# Patient Record
Sex: MALE | Race: WHITE | Employment: FULL TIME | URBAN - METROPOLITAN AREA
[De-identification: names, ages, dates, MRNs, and addresses within clinical notes are randomized per-mention and may not be internally consistent; named-entity substitution may affect disease eponyms.]

---

## 2017-02-13 ENCOUNTER — HOSPITAL ENCOUNTER (OUTPATIENT)
Dept: PREADMISSION TESTING | Age: 62
Discharge: HOME | End: 2017-02-13
Attending: UROLOGY
Payer: COMMERCIAL

## 2017-02-13 LAB
ATRIAL RATE: 61 BPM
CALCULATED P AXIS, ECG09: 33 DEGREES
CALCULATED R AXIS, ECG10: 75 DEGREES
CALCULATED T AXIS, ECG11: 35 DEGREES
DIAGNOSIS, 93000: NORMAL
HCT VFR BLD AUTO: 45.6 % (ref 36–48)
HGB BLD-MCNC: 15.9 G/DL (ref 13–16)
P-R INTERVAL, ECG05: 194 MS
Q-T INTERVAL, ECG07: 410 MS
QRS DURATION, ECG06: 104 MS
QTC CALCULATION (BEZET), ECG08: 412 MS
VENTRICULAR RATE, ECG03: 61 BPM

## 2017-02-13 PROCEDURE — 36415 COLL VENOUS BLD VENIPUNCTURE: CPT | Performed by: UROLOGY

## 2017-02-13 PROCEDURE — 85018 HEMOGLOBIN: CPT | Performed by: UROLOGY

## 2017-02-13 PROCEDURE — 93005 ELECTROCARDIOGRAM TRACING: CPT

## 2017-02-13 RX ORDER — SODIUM CHLORIDE 0.9 % (FLUSH) 0.9 %
5-10 SYRINGE (ML) INJECTION AS NEEDED
Status: CANCELLED | OUTPATIENT
Start: 2017-02-13

## 2017-02-13 RX ORDER — HYDROMORPHONE HYDROCHLORIDE 2 MG/ML
0.5 INJECTION, SOLUTION INTRAMUSCULAR; INTRAVENOUS; SUBCUTANEOUS
Status: CANCELLED | OUTPATIENT
Start: 2017-02-13

## 2017-02-13 RX ORDER — SODIUM CHLORIDE, SODIUM LACTATE, POTASSIUM CHLORIDE, CALCIUM CHLORIDE 600; 310; 30; 20 MG/100ML; MG/100ML; MG/100ML; MG/100ML
1000 INJECTION, SOLUTION INTRAVENOUS CONTINUOUS
Status: CANCELLED | OUTPATIENT
Start: 2017-02-13

## 2017-02-13 RX ORDER — INSULIN LISPRO 100 [IU]/ML
INJECTION, SOLUTION INTRAVENOUS; SUBCUTANEOUS ONCE
Status: CANCELLED | OUTPATIENT
Start: 2017-02-13 | End: 2017-02-14

## 2017-02-13 RX ORDER — NALOXONE HYDROCHLORIDE 0.4 MG/ML
0.1 INJECTION, SOLUTION INTRAMUSCULAR; INTRAVENOUS; SUBCUTANEOUS AS NEEDED
Status: CANCELLED | OUTPATIENT
Start: 2017-02-13

## 2017-02-13 RX ORDER — FLUMAZENIL 0.1 MG/ML
0.2 INJECTION INTRAVENOUS
Status: CANCELLED | OUTPATIENT
Start: 2017-02-13

## 2017-02-13 RX ORDER — MAGNESIUM SULFATE 100 %
4 CRYSTALS MISCELLANEOUS AS NEEDED
Status: CANCELLED | OUTPATIENT
Start: 2017-02-13

## 2017-02-13 RX ORDER — DEXTROSE 50 % IN WATER (D50W) INTRAVENOUS SYRINGE
25-50 AS NEEDED
Status: CANCELLED | OUTPATIENT
Start: 2017-02-13

## 2017-02-14 ENCOUNTER — HOSPITAL ENCOUNTER (OUTPATIENT)
Age: 62
Setting detail: OUTPATIENT SURGERY
Discharge: HOME | End: 2017-02-14
Attending: UROLOGY | Admitting: UROLOGY
Payer: COMMERCIAL

## 2017-02-14 ENCOUNTER — ANESTHESIA (OUTPATIENT)
Dept: SURGERY | Age: 62
End: 2017-02-14
Payer: COMMERCIAL

## 2017-02-14 ENCOUNTER — ANESTHESIA EVENT (OUTPATIENT)
Dept: SURGERY | Age: 62
End: 2017-02-14
Payer: COMMERCIAL

## 2017-02-14 VITALS
HEART RATE: 55 BPM | RESPIRATION RATE: 16 BRPM | TEMPERATURE: 98.2 F | OXYGEN SATURATION: 97 % | HEIGHT: 72 IN | DIASTOLIC BLOOD PRESSURE: 69 MMHG | WEIGHT: 308.9 LBS | BODY MASS INDEX: 41.84 KG/M2 | SYSTOLIC BLOOD PRESSURE: 146 MMHG

## 2017-02-14 PROCEDURE — 76210000022 HC REC RM PH II 1.5 TO 2 HR: Performed by: UROLOGY

## 2017-02-14 PROCEDURE — 77030012020 HC ANTENA NEURSTM MEDT -B: Performed by: UROLOGY

## 2017-02-14 PROCEDURE — 74011000250 HC RX REV CODE- 250

## 2017-02-14 PROCEDURE — C1787 PATIENT PROGR, NEUROSTIM: HCPCS | Performed by: UROLOGY

## 2017-02-14 PROCEDURE — 77030031139 HC SUT VCRL2 J&J -A: Performed by: UROLOGY

## 2017-02-14 PROCEDURE — 74011250636 HC RX REV CODE- 250/636: Performed by: UROLOGY

## 2017-02-14 PROCEDURE — 76010000138 HC OR TIME 0.5 TO 1 HR: Performed by: UROLOGY

## 2017-02-14 PROCEDURE — C1767 GENERATOR, NEURO NON-RECHARG: HCPCS | Performed by: UROLOGY

## 2017-02-14 PROCEDURE — 74011250636 HC RX REV CODE- 250/636

## 2017-02-14 PROCEDURE — 77030010507 HC ADH SKN DERMBND J&J -B: Performed by: UROLOGY

## 2017-02-14 PROCEDURE — 77030018836 HC SOL IRR NACL ICUM -A: Performed by: UROLOGY

## 2017-02-14 PROCEDURE — 74011000250 HC RX REV CODE- 250: Performed by: UROLOGY

## 2017-02-14 PROCEDURE — 76060000032 HC ANESTHESIA 0.5 TO 1 HR: Performed by: UROLOGY

## 2017-02-14 PROCEDURE — 77030020782 HC GWN BAIR PAWS FLX 3M -B: Performed by: UROLOGY

## 2017-02-14 PROCEDURE — 77030011640 HC PAD GRND REM COVD -A: Performed by: UROLOGY

## 2017-02-14 DEVICE — GENERATOR INTERSTIM X --: Type: IMPLANTABLE DEVICE | Site: BUTTOCKS | Status: FUNCTIONAL

## 2017-02-14 RX ORDER — KETAMINE HYDROCHLORIDE 10 MG/ML
INJECTION, SOLUTION INTRAMUSCULAR; INTRAVENOUS AS NEEDED
Status: DISCONTINUED | OUTPATIENT
Start: 2017-02-14 | End: 2017-02-14 | Stop reason: HOSPADM

## 2017-02-14 RX ORDER — GLYCOPYRROLATE 0.2 MG/ML
INJECTION INTRAMUSCULAR; INTRAVENOUS AS NEEDED
Status: DISCONTINUED | OUTPATIENT
Start: 2017-02-14 | End: 2017-02-14 | Stop reason: HOSPADM

## 2017-02-14 RX ORDER — FENTANYL CITRATE 50 UG/ML
INJECTION, SOLUTION INTRAMUSCULAR; INTRAVENOUS AS NEEDED
Status: DISCONTINUED | OUTPATIENT
Start: 2017-02-14 | End: 2017-02-14 | Stop reason: HOSPADM

## 2017-02-14 RX ORDER — PROPOFOL 10 MG/ML
INJECTION, EMULSION INTRAVENOUS
Status: DISCONTINUED | OUTPATIENT
Start: 2017-02-14 | End: 2017-02-14 | Stop reason: HOSPADM

## 2017-02-14 RX ORDER — HYDROCODONE BITARTRATE AND ACETAMINOPHEN 5; 325 MG/1; MG/1
1 TABLET ORAL
Qty: 20 TAB | Refills: 0 | Status: SHIPPED | OUTPATIENT
Start: 2017-02-14 | End: 2018-08-27

## 2017-02-14 RX ORDER — ASPIRIN 81 MG/1
TABLET ORAL DAILY
COMMUNITY
End: 2018-08-27

## 2017-02-14 RX ORDER — SODIUM CHLORIDE, SODIUM LACTATE, POTASSIUM CHLORIDE, CALCIUM CHLORIDE 600; 310; 30; 20 MG/100ML; MG/100ML; MG/100ML; MG/100ML
125 INJECTION, SOLUTION INTRAVENOUS CONTINUOUS
Status: DISCONTINUED | OUTPATIENT
Start: 2017-02-14 | End: 2017-02-14 | Stop reason: HOSPADM

## 2017-02-14 RX ORDER — MIDAZOLAM HYDROCHLORIDE 1 MG/ML
INJECTION, SOLUTION INTRAMUSCULAR; INTRAVENOUS AS NEEDED
Status: DISCONTINUED | OUTPATIENT
Start: 2017-02-14 | End: 2017-02-14 | Stop reason: HOSPADM

## 2017-02-14 RX ADMIN — KETAMINE HYDROCHLORIDE 20 MG: 10 INJECTION, SOLUTION INTRAMUSCULAR; INTRAVENOUS at 09:14

## 2017-02-14 RX ADMIN — GLYCOPYRROLATE 0.1 MG: 0.2 INJECTION INTRAMUSCULAR; INTRAVENOUS at 09:10

## 2017-02-14 RX ADMIN — SODIUM CHLORIDE, SODIUM LACTATE, POTASSIUM CHLORIDE, AND CALCIUM CHLORIDE 125 ML/HR: 600; 310; 30; 20 INJECTION, SOLUTION INTRAVENOUS at 07:39

## 2017-02-14 RX ADMIN — KETAMINE HYDROCHLORIDE 10 MG: 10 INJECTION, SOLUTION INTRAMUSCULAR; INTRAVENOUS at 09:23

## 2017-02-14 RX ADMIN — PROPOFOL 75 MCG/KG/MIN: 10 INJECTION, EMULSION INTRAVENOUS at 09:14

## 2017-02-14 RX ADMIN — MIDAZOLAM HYDROCHLORIDE 3 MG: 1 INJECTION, SOLUTION INTRAMUSCULAR; INTRAVENOUS at 09:07

## 2017-02-14 RX ADMIN — VANCOMYCIN HYDROCHLORIDE 2 G: 10 INJECTION, POWDER, LYOPHILIZED, FOR SOLUTION INTRAVENOUS at 09:07

## 2017-02-14 RX ADMIN — FENTANYL CITRATE 100 MCG: 50 INJECTION, SOLUTION INTRAMUSCULAR; INTRAVENOUS at 09:11

## 2017-02-14 RX ADMIN — MIDAZOLAM HYDROCHLORIDE 2 MG: 1 INJECTION, SOLUTION INTRAMUSCULAR; INTRAVENOUS at 09:10

## 2017-02-14 NOTE — DISCHARGE INSTRUCTIONS
DISCHARGE SUMMARY from Nurse    The following personal items are in your possession at time of discharge:    Dental Appliances: None  Visual Aid: None     Home Medications: None  Jewelry: None  Clothing: Pants, Shirt, Undergarments, Footwear, Socks, Jacket/Coat (locker 12)  Other Valuables: Verl Pencil, Cell Phone (with Adithyakelsea Hidalgo)             PATIENT INSTRUCTIONS:    After general anesthesia or intravenous sedation, for 24 hours or while taking prescription Narcotics:  · Limit your activities  · Do not drive and operate hazardous machinery  · Do not make important personal or business decisions  · Do  not drink alcoholic beverages  · If you have not urinated within 8 hours after discharge, please contact your surgeon on call. Report the following to your surgeon:  · Excessive pain, swelling, redness or odor of or around the surgical area  · Temperature over 100.5  · Nausea and vomiting lasting longer than 4 hours or if unable to take medications  · Any signs of decreased circulation or nerve impairment to extremity: change in color, persistent  numbness, tingling, coldness or increase pain  · Any questions        What to do at Home:  Regular diet drink lots of liquids  Ok to shower in 48 hrs  No heavy lifting today  Dr Keron Browning will call regarding a post op check up    If you experience any of the following symptoms heavy bleeding, fevers, severe pain, unable to void, please follow up with dr Keron Browning. *  Please give a list of your current medications to your Primary Care Provider. *  Please update this list whenever your medications are discontinued, doses are      changed, or new medications (including over-the-counter products) are added. *  Please carry medication information at all times in case of emergency situations.           These are general instructions for a healthy lifestyle:    No smoking/ No tobacco products/ Avoid exposure to second hand smoke    Surgeon General's Warning:  Quitting smoking now greatly reduces serious risk to your health. Obesity, smoking, and sedentary lifestyle greatly increases your risk for illness    A healthy diet, regular physical exercise & weight monitoring are important for maintaining a healthy lifestyle    You may be retaining fluid if you have a history of heart failure or if you experience any of the following symptoms:  Weight gain of 3 pounds or more overnight or 5 pounds in a week, increased swelling in our hands or feet or shortness of breath while lying flat in bed. Please call your doctor as soon as you notice any of these symptoms; do not wait until your next office visit. Recognize signs and symptoms of STROKE:    F-face looks uneven    A-arms unable to move or move unevenly    S-speech slurred or non-existent    T-time-call 911 as soon as signs and symptoms begin-DO NOT go       Back to bed or wait to see if you get better-TIME IS BRAIN. Warning Signs of HEART ATTACK     Call 911 if you have these symptoms:   Chest discomfort. Most heart attacks involve discomfort in the center of the chest that lasts more than a few minutes, or that goes away and comes back. It can feel like uncomfortable pressure, squeezing, fullness, or pain.  Discomfort in other areas of the upper body. Symptoms can include pain or discomfort in one or both arms, the back, neck, jaw, or stomach.  Shortness of breath with or without chest discomfort.  Other signs may include breaking out in a cold sweat, nausea, or lightheadedness. Don't wait more than five minutes to call 911 - MINUTES MATTER! Fast action can save your life. Calling 911 is almost always the fastest way to get lifesaving treatment. Emergency Medical Services staff can begin treatment when they arrive -- up to an hour sooner than if someone gets to the hospital by car. The discharge information has been reviewed with the patient and caregiver. The patient and caregiver verbalized understanding.     Discharge medications reviewed with the patient and caregiver and appropriate educational materials and side effects teaching were provided.     Patient armband removed and shredded

## 2017-02-14 NOTE — INTERVAL H&P NOTE
H&P Update:  Daron Keyes was seen and examined. History and physical has been reviewed. The patient has been examined.  There have been no significant clinical changes since the completion of the originally dated History and Physical.    Signed By: Manuel Cota MD     February 14, 2017 8:51 AM

## 2017-02-14 NOTE — ANESTHESIA PREPROCEDURE EVALUATION
Anesthetic History   No history of anesthetic complications            Review of Systems / Medical History  Patient summary reviewed, nursing notes reviewed and pertinent labs reviewed    Pulmonary  Within defined limits      Sleep apnea: CPAP           Neuro/Psych   Within defined limits           Cardiovascular  Within defined limits  Hypertension: well controlled              Exercise tolerance: >4 METS     GI/Hepatic/Renal  Within defined limits              Endo/Other        Morbid obesity and arthritis     Other Findings              Physical Exam    Airway  Mallampati: III  TM Distance: < 4 cm  Neck ROM: normal range of motion   Mouth opening: Normal     Cardiovascular    Rhythm: regular  Rate: normal         Dental    Dentition: Poor dentition  Comments: Poor dentition on top. Broken and missing teeth.     Pulmonary  Breath sounds clear to auscultation               Abdominal  GI exam deferred       Other Findings            Anesthetic Plan    ASA: 3  Anesthesia type: MAC          Induction: Intravenous  Anesthetic plan and risks discussed with: Patient

## 2017-02-14 NOTE — ANESTHESIA POSTPROCEDURE EVALUATION
Post-Anesthesia Evaluation & Assessment    Visit Vitals    /70    Pulse (!) 56    Temp 36.8 °C (98.2 °F)    Resp 16    Ht 6' (1.829 m)    Wt 140.1 kg (308 lb 14.4 oz)    SpO2 96%    BMI 41.89 kg/m2       No untreated/active PONV    Post-operative hydration adequate. Adequate post-operative analgesia per PACU discharge criteria    Mental status & level of consciousness: alert and oriented x 3    Respiratory status: patent unassisted airway     No apparent anesthetic complications requiring additional post-anesthetic care    Patient has met all discharge requirements.             Gio Bailey MD

## 2017-02-14 NOTE — OP NOTES
[address] [place] [address]      PREOPERATIVE DIAGNOSIS:  URGE INCONTINENCE    POSTOPERATIVE DIAGNOSIS:  urge incontinence     PROCEDURE:  Second-stage InterStim battery change with programming. SURGEON:  Tiffanie Whaley MD    ANESTHESIA:  IV sedation. SPECIMENS: interstim neuromodulator    FINDINGS:  new generator with excellent impedences. BLOOD LOSS:  Minimal.    DRAINS AND TUBES:  None. COMPLICATIONS:  None. DESCRIPTION OF PROCEDURE:  After informed consent was obtained, the patient was taken to the operating room, was placed in the prone position after IV sedation was instituted. The patient was prepped and draped in usual surgical fashion. Local anesthetic was used to anesthetize the pocket. The pocket was then opened with a scalpel, and sharp and blunt dissection was used to open the pocket up. The interstim generator was explanted from the pocket without problem,  The lead was disonnected from the generator after unsrewing the connecting bolt and the old generator was passed off. Copious irrigation was done with antibiotic solution. The InterStim neurostimulator II was then connected to the tined lead after it was dried off. The hex nut wrench was used to tighten down the ratcheted hex nut. The neurostimulator was placed back into the pocket with the wire hidden underneath it. A sterile towel was placed over the wound. Next, the programming antenna was placed into a sterile glove and placed on top of the towel. The neurostimulator was interrogated and impedances were between 50 and 4000, indicating appropriate function. The antenna was passed off the field sterilely. The towel was passed off the field. The wound was then closed in two layers; a 3-0 Vicryl running suture for the Amy layer, and a 4-0 Vicryl for the subcuticular layer. The wound was then washed off and dried. Dermabond was placed over the incisions, and the procedure ended.  The patient was placed in the supine position on the stretcher, awakened from IV sedation, and transferred to the recovery room awake, alert, and in stable condition.

## 2017-02-14 NOTE — IP AVS SNAPSHOT
Rashard Peng 
 
 
 [address] [address] Patient: Ronaldo Guzmán MRN: [id number] You are allergic to the following No active allergies Recent Documentation Height Weight BMI Smoking Status 1.829 m 140.1 kg 41.89 kg/m2 Former Smoker Emergency Contacts Name Discharge Info Relation Home Work Mobile Gisella Lugo DISCHARGE CAREGIVER [3] Spouse [3] [phone] Silvia Guerra CAREGIVER [3] Daughter [21] [phone] About your hospitalization You were admitted on:  February 14, 2017 You last received care in the:  [facility] PHASE 2 RECOVERY You were discharged on:  February 14, 2017 Unit phone number:    
  
Why you were hospitalized Your primary diagnosis was:  Urge Incontinence Of Urine Providers Seen During Your Hospitalizations Provider Role Specialty Primary office phone Jeannette Belle MD Attending Provider Urology [phone] Your Primary Care Physician (PCP) Primary Care Physician Office Phone Office Fax 1 [facility], [address] [phone] Follow-up Information Follow up With Details Comments Contact Info Antwon France MD   [address] [place] [id number] [phone] Current Discharge Medication List  
  
START taking these medications Dose & Instructions Dispensing Information Comments Morning Noon Evening Bedtime HYDROcodone-acetaminophen 5-325 mg per tablet Commonly known as:  Gabe Handler Your next dose is: Today, Tomorrow Other:  _________ Dose:  1 Tab Take 1 Tab by mouth every four (4) hours as needed for Pain. Max Daily Amount: 6 Tabs. Quantity:  20 Tab Refills:  0 CONTINUE these medications which have NOT CHANGED Dose & Instructions Dispensing Information Comments Morning Noon Evening Bedtime acetaminophen 500 mg tablet Commonly known as:  TYLENOL Your next dose is: Today, Tomorrow Other:  _________ Dose:  500 mg Take 500 mg by mouth every six (6) hours as needed for Pain. Refills:  0  
     
   
   
   
  
 AFRIN NASAL SPRAY NA Your next dose is: Today, Tomorrow Other:  _________ Dose:  2 Spray 2 Sprays by Nasal route. Each nostril by inhalation daily as needed Refills:  0  
     
   
   
   
  
 alfuzosin SR 10 mg SR tablet Commonly known as:  Isadora Ford Your next dose is: Today, Tomorrow Other:  _________ Dose:  10 mg Take 10 mg by mouth nightly. Indications: SYMPTOMATIC BENIGN PROSTATIC HYPERPLASIA Refills:  0  
     
   
   
   
  
 amLODIPine 10 mg tablet Commonly known as:  Erasto Chafe Your next dose is: Today, Tomorrow Other:  _________ Dose:  10 mg Take 10 mg by mouth daily. Indications: HYPERTENSION Refills:  0  
     
   
   
   
  
 aspirin delayed-release 81 mg tablet Your next dose is: Today, Tomorrow Other:  _________ Take  by mouth daily. Refills:  0  
     
   
   
   
  
 B COMPLEX 1 tablet Generic drug:  b complex vitamins Your next dose is: Today, Tomorrow Other:  _________ Dose:  1 Tab Take 1 Tab by mouth daily. Refills:  0  
     
   
   
   
  
 carvedilol 12.5 mg tablet Commonly known as:  Nakia Dome Your next dose is: Today, Tomorrow Other:  _________ Dose:  12.5 mg Take 12.5 mg by mouth two (2) times daily (with meals). Indications: hypertension Refills:  0  
     
   
   
   
  
 cholecalciferol (VITAMIN D3) 5,000 unit Tab tablet Commonly known as:  VITAMIN D3 Your next dose is: Today, Tomorrow Other:  _________ Dose:  5000 Units Take 5,000 Units by mouth daily. Refills:  0  
     
   
   
   
  
 CYMBALTA 20 mg capsule Generic drug:  DULoxetine Your next dose is: Today, Tomorrow Other:  _________ Take  by mouth daily. Indications: ANXIETY WITH DEPRESSION Refills:  0 GLUCOSAMINE HCL-MSM PO Your next dose is: Today, Tomorrow Other:  _________ Dose:  2 Tab Take 2 Tabs by mouth daily. Refills:  0  
     
   
   
   
  
 ibuprofen 800 mg tablet Commonly known as:  MOTRIN Your next dose is: Today, Tomorrow Other:  _________ Dose:  800 mg Take 800 mg by mouth every six (6) hours as needed for Pain. Refills:  0  
     
   
   
   
  
 LASIX 20 mg tablet Generic drug:  furosemide Your next dose is: Today, Tomorrow Other:  _________ Dose:  20 mg Take 20 mg by mouth daily. Indications: Edema Refills:  0  
     
   
   
   
  
 lysine 500 mg Tab tablet Commonly known as:  L-LYSINE Your next dose is: Today, Tomorrow Other:  _________ Dose:  500 mg Take 500 mg by mouth daily. Refills:  0  
     
   
   
   
  
 multivitamin tablet Commonly known as:  ONE A DAY Your next dose is: Today, Tomorrow Other:  _________ Dose:  1 Tab Take 1 Tab by mouth daily. Refills:  0  
     
   
   
   
  
 testosterone cypionate 200 mg/mL injection Commonly known as:  DEPOTESTOTERONE CYPIONATE Your next dose is: Today, Tomorrow Other:  _________ Dose:  200 mg  
200 mg by IntraMUSCular route once. Every 10 days Refills:  0  
     
   
   
   
  
 tolterodine ER 4 mg ER capsule Commonly known as:  Júniorrefugio Galvezs Your next dose is: Today, Tomorrow Other:  _________ Dose:  4 mg Take 4 mg by mouth daily. Indications: INCREASED URINARY FREQUENCY Refills:  0  
     
   
   
   
  
 valsartan-hydroCHLOROthiazide 320-25 mg per tablet Commonly known as:  DIOVAN-HCT Your next dose is: Today, Tomorrow Other:  _________ Dose:  1 Tab Take 1 Tab by mouth daily. Indications: HYPERTENSION Refills:  0  
     
   
   
   
  
 VITAMIN C 500 mg tablet Generic drug:  ascorbic acid (vitamin C) Your next dose is: Today, Tomorrow Other:  _________ Dose:  1000 mg Take 1,000 mg by mouth daily. Refills:  0 Where to Get Your Medications Information on where to get these meds will be given to you by the nurse or doctor. ! Ask your nurse or doctor about these medications HYDROcodone-acetaminophen 5-325 mg per tablet Discharge Instructions DISCHARGE SUMMARY from Nurse The following personal items are in your possession at time of discharge: 
 
Dental Appliances: None Visual Aid: None Home Medications: None Jewelry: None Clothing: Pants, Shirt, Undergarments, Footwear, Socks, Jacket/Coat (locker 12) Other Valuables: Ean Pringle, Cell Phone (with Nivelap) PATIENT INSTRUCTIONS: 
 
After general anesthesia or intravenous sedation, for 24 hours or while taking prescription Narcotics: · Limit your activities · Do not drive and operate hazardous machinery · Do not make important personal or business decisions · Do  not drink alcoholic beverages · If you have not urinated within 8 hours after discharge, please contact your surgeon on call. Report the following to your surgeon: 
· Excessive pain, swelling, redness or odor of or around the surgical area · Temperature over 100.5 · Nausea and vomiting lasting longer than 4 hours or if unable to take medications · Any signs of decreased circulation or nerve impairment to extremity: change in color, persistent  numbness, tingling, coldness or increase pain · Any questions What to do at Home: 
Regular diet drink lots of liquids Ok to shower in 48 hrs No heavy lifting today Dr Rony Bishop will call regarding a post op check up If you experience any of the following symptoms heavy bleeding, fevers, severe pain, unable to void, please follow up with dr Toan Farrar. *  Please give a list of your current medications to your Primary Care Provider. *  Please update this list whenever your medications are discontinued, doses are 
    changed, or new medications (including over-the-counter products) are added. *  Please carry medication information at all times in case of emergency situations. These are general instructions for a healthy lifestyle: No smoking/ No tobacco products/ Avoid exposure to second hand smoke Surgeon General's Warning:  Quitting smoking now greatly reduces serious risk to your health. Obesity, smoking, and sedentary lifestyle greatly increases your risk for illness A healthy diet, regular physical exercise & weight monitoring are important for maintaining a healthy lifestyle You may be retaining fluid if you have a history of heart failure or if you experience any of the following symptoms:  Weight gain of 3 pounds or more overnight or 5 pounds in a week, increased swelling in our hands or feet or shortness of breath while lying flat in bed. Please call your doctor as soon as you notice any of these symptoms; do not wait until your next office visit. Recognize signs and symptoms of STROKE: 
 
F-face looks uneven A-arms unable to move or move unevenly S-speech slurred or non-existent T-time-call 911 as soon as signs and symptoms begin-DO NOT go Back to bed or wait to see if you get better-TIME IS BRAIN. Warning Signs of HEART ATTACK Call 911 if you have these symptoms: 
? Chest discomfort. Most heart attacks involve discomfort in the center of the chest that lasts more than a few minutes, or that goes away and comes back. It can feel like uncomfortable pressure, squeezing, fullness, or pain. ? Discomfort in other areas of the upper body.  Symptoms can include pain or discomfort in one or both arms, the back, neck, jaw, or stomach. ? Shortness of breath with or without chest discomfort. ? Other signs may include breaking out in a cold sweat, nausea, or lightheadedness. Don't wait more than five minutes to call [address]! Fast action can save your life. Calling 911 is almost always the fastest way to get lifesaving treatment. Emergency Medical Services staff can begin treatment when they arrive  up to an hour sooner than if someone gets to the hospital by car. The discharge information has been reviewed with the patient and caregiver. The patient and caregiver verbalized understanding. Discharge medications reviewed with the patient and caregiver and appropriate educational materials and side effects teaching were provided. Patient armband removed and shredded Discharge Orders None Introducing \[facility]\"" & [facility] SERVICES! Lani Santoyo introduces [facility] patient portal. Now you can access parts of your medical record, email your doctor's office, and request medication refills online. 1. In your internet browser, go to [email or web address] 2. Click on the First Time User? Click Here link in the Sign In box. You will see the New Member Sign Up page. 3. Enter your [facility] Access Code exactly as it appears below. You will not need to use this code after youve completed the sign-up process. If you do not sign up before the expiration date, you must request a new code. · [facility] Access Code: [email or web address] Expires: 5/2/2017  3:24 PM 
 
4. Enter the last four digits of your Social Security Number (xxxx) and Date of Birth (mm/dd/yyyy) as indicated and click Submit. You will be taken to the next sign-up page. 5. Create a [employer] ID. This will be your [facility] login ID and cannot be changed, so think of one that is secure and easy to remember. 6. Create a [employer] password. You can change your password at any time. 7. Enter your Password Reset Question and Answer. This can be used at a later time if you forget your password. 8. Enter your e-mail address. You will receive e-mail notification when new information is available in [address] 9. Click Sign Up. You can now view and download portions of your medical record. 10. Click the Download Summary menu link to download a portable copy of your medical information. If you have questions, please visit the Frequently Asked Questions section of the [employer] website. Remember, [employer] is NOT to be used for urgent needs. For medical emergencies, dial 911. Now available from your iPhone and Android! General Information Please provide this summary of care documentation to your next provider. Patient Signature:  ____________________________________________________________ Date:  ____________________________________________________________  
  
Sergey Sanchez Provider Signature:  ____________________________________________________________ Date:  ____________________________________________________________

## 2017-02-14 NOTE — H&P
A    [facility], [address] [place] [address] [id number]  phone: [phone]  fax: [phone]          Patient: Adair Merlos  YOB: 1955        Assessment/Plan  # Detail Type Description    1. Assessment Testicular hypofunction (E29.1). Patient Plan He is given a new script for injections. T level and H and H in 6 months         2. Assessment Enlarged prostate with lower urinary tract symptoms (N40.1). Patient Plan He is doing fine. continue alfuzosin         3. Assessment Urge incontinence (N39.41). Patient Plan We will change his interstim generator. risks of bleeding, infection, pain, were discussed. This 62year old  patient was referred by Marga Pelletier. This 62year old male presents for BPH and Hypogonadism. History of Present Illness:  1. BPH   Onset was gradual. Severity level is moderate. It occurs daily. The problem is worse. Associated symptoms include incomplete emptying, nocturia, pressure, slow stream, splitting stream, urgency, urinary incontinence, dribbling and urinary frequency. Pertinent negatives include chills, constipation and fever. Additional information: He still has a slow stream.  He still has poor emptying. He has urgency and urge incontinence. His cysto showed a surpringly unobstructive prostate. He started [place] and Myrbetriq and improved but still wore diapers. He is now just on uroxatral and toviaz and wears 3 diapers per day. Myrbetriq isn't covered. His insurance wouldn't cover botox. He had an Interstim on 12/22/16. .      2/22/16 - He stopped the [place] and doesn't notice a difference. Things have worsened and he has had to increase the interstim up to 2.5 amplitude. When the interstim was first placed - no accidents for a month. Things worsened and he had he would wear 3 pads per day.   He is down to 1 pad per day now on program 3 at 2.7.    1/26/17 -- He has had his amplitude up to 8.0 and it was working great. However, in the past few weeks, it no longer works and he is back to urgency and leaking. The batter is dead. 2.  Hypogonadism   The symptoms began gradually, have been moderate and are improving. The patient is here today for a follow up visit. The patient states he does have difficultly attaining an erection and has difficulty maintaining an erection. The adequacy of his erection measures 70.00% rigidity. Pertinent history does not include diabetes, hypercholesterolemia, hyperlipidemia, hypertension, neurologic disease, pelvic surgery, penile fracture or prostate cancer. He denies depression. Additional information: + fatigue, + decreased energy     T = 121 (7/2013)  No problems with erections, low libidio. (he was given axiron and feels somewhat better. On fortesta he has imporved energy and T = 426 (10/2013)   He is better with improved enrgy and he feels well, but his insurance coverage won't cover it --     On injections he does well T = 818 (7/2/15) and HCt = 43 and PSA = 1.01 and he remians very happy. 1/26/17 -- He feels like things dropped considerably  in the last month    He is on injections and he prefers it to topical therapy. T=222 (10/2016) and HCT=41.5 . Nursing Comments  Intake Comments: Patient presents today for a follow up.  cw        PAST MEDICAL/SURGICAL HISTORY   (Reviewed, updated)    Disease/disorder Onset Date Management Date Comments     interstim 12/22/2015    Anxiety       Depression       HTN       Hyperlipidemia       THOMAS  C-PAP       hernia repair x 2       DIAGNOSTICS HISTORY:  Test Ordered Interpretation Result completed   US Retroperitoneum AAA Renal/Aorta  normal  06/27/2013   * X-RAY EXAM OF LOWER SPINE, 2 Or 3 Views 07/16/2013 07/16/2013     Test Ordered Ordering Comments Modifier   US Retroperitoneum AAA Renal/Aorta  renal/bladder u/s    * X-RAY EXAM OF LOWER SPINE, 2 Or 3 Views 07/16/2013         PROBLEM LIST:  Problem Description Onset Date   BMI 40.0-44.9, adult 02/22/2016   Obesity 03/04/2011   Vitamin D deficiency 03/04/2011   Hyperlipidemia 03/04/2011   Benign essential hypertension 03/04/2011   Benign prostatic hyperplasia 07/17/2013   Testicular hypofunction 07/17/2013     Patient Status   Completed with information received for patient transitioning into care. Completed with information received for patient in a summary of care record. Medication Reconciliation  Medications reconciled today. Medication Reviewed  Adherence Medication Name Sig Desc Elsewhere Status   taking as directed aspirin 81 mg Tab take 1 tablet (81MG)  by oral route  every day N Verified   taking as directed Syringe 3cc/12St7-6/2\" 3 mL 21 x 1 1/2\" inject by Intramuscular route for Testosterone inj q 3 wks.  N Verified   taking as directed fluticasone 50 mcg/actuation nasal spray,suspension spray 1 spray by intranasal route  every day in each nostril N Verified   taking as directed clobetasol 0.05 % topical ointment apply by topical route 2 times every day a thin layer to the affected area(s) N Verified   taking as directed testosterone cypionate 200 mg/mL intramuscular oil inject 2 milliliter by intramuscular route  every 10 days N Verified   taking as directed ibuprofen 800 mg tablet take 1 tablet by oral route 3 times every day with food N Verified   taking as directed Diovan  mg-25 mg tablet take 1 tablet by oral route  every day N Verified   taking as directed ketoconazole 2 % topical cream apply by topical route  every day to the affected area(s) N Verified   taking as directed amlodipine 10 mg tablet take 1 tablet (10MG)  by oral route  every day N Verified   taking as directed Lexapro 20 mg tablet take 1 tablet by oral route  every day N Verified   taking as directed alfuzosin ER 10 mg tablet,extended release 24 hr take 1 tablet by oral route  every day N Verified   taking as directed omeprazole 20 mg tablet,delayed release take 1 Tablet by Oral route once N Verified   taking as directed Lasix 20 mg tablet take 1 Tablet by Oral route  every day N Verified   taking as directed Coreg 12.5 mg tablet take 1 tablet by oral route 2 times every day with food N Verified   taking as directed tolterodine ER 4 mg capsule,extended release 24 hr take 1 capsule by oral route  every day N Verified   taking as directed Metrogel 1 % topical apply by topical route  every day to the affected area(s) ; rub in gently and completely N Verified   taking as directed Cymbalta 60 mg capsule,delayed release take 1 capsule by oral route  every day N Verified     Allergies  Ingredient Reaction Medication Name Comment   NO KNOWN ALLERGIES        (Reviewed, updated.)  Family History:  (Reviewed, updated)  Relationship Family Member Name  Age at Death Condition Onset Age Cause of Death   Father  N  cancer, unknown type  N         Social History:  (Reviewed, updated)  Tobacco use reviewed. The patient is right-handed. Preferred language is [place]. MARITAL STATUS/FAMILY/SOCIAL SUPPORT  Currently . Tobacco use status: Never smoked tobacco.  Smoking status: Never smoker. SMOKING STATUS  Use Status Type Smoking Status Usage Per Day Years Used Total Pack Years   no/never  Never smoker          ALCOHOL  There is a history of alcohol use. consumed rarely. CAFFEINE  The patient uses caffeine: coffee - 4 cups a day. Sabianist/SPIRITUAL  Patient agrees to transfusion. Advance Directives:  STATUS  Last reviewed on:     DIRECTIVES  Transfusion: Patient agrees to transfusion. Review of Systems  System Neg/Pos Details   Constitutional Negative Chills and fever. ENMT Negative Ear infections and sore throat. Eyes Negative Blurred vision, double vision and eye pain. Respiratory Negative Asthma, chronic cough, dyspnea and wheezing. Cardio Negative Chest pain.    GI Negative Constipation, decreased appetite, diarrhea, nausea and vomiting.  Positive Incomplete emptying, Nocturia, Pressure, Slow stream, Splitting stream, Urgency, Urinary dribbling, Urinary frequency, Urinary incontinence. Endocrine Negative Cold intolerance, heat intolerance, increased thirst and weight loss. Neuro Negative Headache and tremors. Psych Negative Anxiety and depression. Integumentary Negative Itching skin and rash. MS Negative Back pain and joint pain. Hema/Lymph Negative Easy bleeding. Reproductive Negative Sexual dysfunction. Physical Exam  Exam Findings Details   Constitutional Normal Well developed. Neck Exam Normal Inspection - Normal.   Respiratory Normal Inspection - Normal.   Abdomen Normal No abdominal tenderness. Genitourinary Normal No CVA tenderness. No suprapubic tenderness. Rectal Normal Anus - Normal. Sphincter - Normal. Prostate - Normal.   Extremity Normal No edema. Psychiatric Normal Oriented to time, place, person and situation. Appropriate mood and affect.          Medications (added, continued, or stopped today)  Started Medication Directions Instruction Stopped   12/21/2016 alfuzosin ER 10 mg tablet,extended release 24 hr take 1 tablet by oral route  every day  01/26/2017 01/26/2017 alfuzosin ER 10 mg tablet,extended release 24 hr take 1 tablet by oral route  every day     10/21/2016 amlodipine 10 mg tablet take 1 tablet (10MG)  by oral route  every day     03/04/2011 aspirin 81 mg Tab take 1 tablet (81MG)  by oral route  every day     06/30/2015 clobetasol 0.05 % topical ointment apply by topical route 2 times every day a thin layer to the affected area(s)     01/25/2017 Coreg 12.5 mg tablet take 1 tablet by oral route 2 times every day with food     01/25/2017 Cymbalta 60 mg capsule,delayed release take 1 capsule by oral route  every day     10/21/2016 Diovan  mg-25 mg tablet take 1 tablet by oral route  every day     06/30/2015 fluticasone 50 mcg/actuation nasal spray,suspension spray 1 spray by intranasal route  every day in each nostril     07/07/2016 ibuprofen 800 mg tablet take 1 tablet by oral route 3 times every day with food     10/21/2016 ketoconazole 2 % topical cream apply by topical route  every day to the affected area(s)     12/22/2016 Lasix 20 mg tablet take 1 Tablet by Oral route  every day     10/21/2016 Lexapro 20 mg tablet take 1 tablet by oral route  every day     01/25/2017 Metrogel 1 % topical apply by topical route  every day to the affected area(s) ; rub in gently and completely     12/22/2016 omeprazole 20 mg tablet,delayed release take 1 Tablet by Oral route once     10/17/2014 Syringe 3cc/52Fn6-2/2\" 3 mL 21 x 1 1/2\" inject by Intramuscular route for Testosterone inj q 3 wks. 01/26/2017 01/26/2017 Syringe 3cc/50Cc1-1/2\" 3 mL 21 x 1 1/2\" inject by Intramuscular route for Testosterone inj q 3 wks. 10/21/2016 testosterone cypionate 200 mg/mL intramuscular oil inject 2 milliliter by intramuscular route  every 10 days  01/26/2017 01/26/2017 testosterone cypionate 200 mg/mL intramuscular oil inject 2 milliliter by intramuscular route  every 10 days     12/23/2016 tolterodine ER 4 mg capsule,extended release 24 hr take 1 capsule by oral route  every day  01/26/2017 01/26/2017 tolterodine ER 4 mg capsule,extended release 24 hr take 1 capsule by oral route  every day       Some information was carried forward from a previous visit for review purposes only.     Completed by:         Radha Bryant MD

## 2018-08-14 ENCOUNTER — HOSPITAL ENCOUNTER (OUTPATIENT)
Dept: PREADMISSION TESTING | Age: 63
Discharge: HOME | End: 2018-08-14
Payer: COMMERCIAL

## 2018-08-14 DIAGNOSIS — Z01.818 PREOP EXAMINATION: ICD-10-CM

## 2018-08-14 DIAGNOSIS — N39.41 URGE INCONTINENCE: ICD-10-CM

## 2018-08-14 DIAGNOSIS — R35.0 URINARY FREQUENCY: ICD-10-CM

## 2018-08-14 LAB
ANION GAP SERPL CALC-SCNC: 9 MMOL/L (ref 3–18)
ATRIAL RATE: 72 BPM
BASOPHILS # BLD: 0 K/UL (ref 0–0.1)
BASOPHILS NFR BLD: 1 % (ref 0–2)
BUN SERPL-MCNC: 21 MG/DL (ref 7–18)
BUN/CREAT SERPL: 17 (ref 12–20)
CALCIUM SERPL-MCNC: 9.3 MG/DL (ref 8.5–10.1)
CALCULATED P AXIS, ECG09: 6 DEGREES
CALCULATED R AXIS, ECG10: 44 DEGREES
CALCULATED T AXIS, ECG11: 48 DEGREES
CHLORIDE SERPL-SCNC: 102 MMOL/L (ref 100–108)
CO2 SERPL-SCNC: 29 MMOL/L (ref 21–32)
CREAT SERPL-MCNC: 1.24 MG/DL (ref 0.6–1.3)
DIAGNOSIS, 93000: NORMAL
DIFFERENTIAL METHOD BLD: ABNORMAL
EOSINOPHIL # BLD: 0.5 K/UL (ref 0–0.4)
EOSINOPHIL NFR BLD: 6 % (ref 0–5)
ERYTHROCYTE [DISTWIDTH] IN BLOOD BY AUTOMATED COUNT: 14 % (ref 11.6–14.5)
EST. AVERAGE GLUCOSE BLD GHB EST-MCNC: 126 MG/DL
GLUCOSE SERPL-MCNC: 98 MG/DL (ref 74–99)
HBA1C MFR BLD: 6 % (ref 4.5–5.6)
HCT VFR BLD AUTO: 44.2 % (ref 36–48)
HGB BLD-MCNC: 15.1 G/DL (ref 13–16)
LYMPHOCYTES # BLD: 2.2 K/UL (ref 0.9–3.6)
LYMPHOCYTES NFR BLD: 25 % (ref 21–52)
MCH RBC QN AUTO: 29 PG (ref 24–34)
MCHC RBC AUTO-ENTMCNC: 34.2 G/DL (ref 31–37)
MCV RBC AUTO: 85 FL (ref 74–97)
MONOCYTES # BLD: 0.7 K/UL (ref 0.05–1.2)
MONOCYTES NFR BLD: 8 % (ref 3–10)
NEUTS SEG # BLD: 5.5 K/UL (ref 1.8–8)
NEUTS SEG NFR BLD: 60 % (ref 40–73)
P-R INTERVAL, ECG05: 192 MS
PLATELET # BLD AUTO: 283 K/UL (ref 135–420)
PMV BLD AUTO: 9.5 FL (ref 9.2–11.8)
POTASSIUM SERPL-SCNC: 3 MMOL/L (ref 3.5–5.5)
Q-T INTERVAL, ECG07: 382 MS
QRS DURATION, ECG06: 114 MS
QTC CALCULATION (BEZET), ECG08: 418 MS
RBC # BLD AUTO: 5.2 M/UL (ref 4.7–5.5)
SODIUM SERPL-SCNC: 140 MMOL/L (ref 136–145)
VENTRICULAR RATE, ECG03: 72 BPM
WBC # BLD AUTO: 8.8 K/UL (ref 4.6–13.2)

## 2018-08-14 PROCEDURE — 85025 COMPLETE CBC W/AUTO DIFF WBC: CPT | Performed by: UROLOGY

## 2018-08-14 PROCEDURE — 93005 ELECTROCARDIOGRAM TRACING: CPT

## 2018-08-14 PROCEDURE — 83036 HEMOGLOBIN GLYCOSYLATED A1C: CPT | Performed by: UROLOGY

## 2018-08-14 PROCEDURE — 80048 BASIC METABOLIC PNL TOTAL CA: CPT | Performed by: UROLOGY

## 2018-08-14 PROCEDURE — 36415 COLL VENOUS BLD VENIPUNCTURE: CPT | Performed by: UROLOGY

## 2018-09-11 ENCOUNTER — HOSPITAL ENCOUNTER (OUTPATIENT)
Age: 63
Setting detail: OUTPATIENT SURGERY
Discharge: HOME | End: 2018-09-11
Attending: UROLOGY | Admitting: UROLOGY
Payer: COMMERCIAL

## 2018-09-11 ENCOUNTER — ANESTHESIA EVENT (OUTPATIENT)
Dept: SURGERY | Age: 63
End: 2018-09-11
Payer: COMMERCIAL

## 2018-09-11 ENCOUNTER — APPOINTMENT (OUTPATIENT)
Dept: GENERAL RADIOLOGY | Age: 63
End: 2018-09-11
Attending: UROLOGY
Payer: COMMERCIAL

## 2018-09-11 ENCOUNTER — ANESTHESIA (OUTPATIENT)
Dept: SURGERY | Age: 63
End: 2018-09-11
Payer: COMMERCIAL

## 2018-09-11 VITALS
DIASTOLIC BLOOD PRESSURE: 79 MMHG | OXYGEN SATURATION: 97 % | HEIGHT: 72 IN | TEMPERATURE: 97.7 F | SYSTOLIC BLOOD PRESSURE: 138 MMHG | RESPIRATION RATE: 16 BRPM | WEIGHT: 273.31 LBS | BODY MASS INDEX: 37.02 KG/M2 | HEART RATE: 64 BPM

## 2018-09-11 LAB
GLUCOSE BLD STRIP.AUTO-MCNC: 135 MG/DL (ref 70–110)
POTASSIUM SERPL-SCNC: 2.8 MMOL/L (ref 3.5–5.5)

## 2018-09-11 PROCEDURE — 74011250636 HC RX REV CODE- 250/636: Performed by: UROLOGY

## 2018-09-11 PROCEDURE — 36415 COLL VENOUS BLD VENIPUNCTURE: CPT | Performed by: UROLOGY

## 2018-09-11 PROCEDURE — 96365 THER/PROPH/DIAG IV INF INIT: CPT

## 2018-09-11 PROCEDURE — 82962 GLUCOSE BLOOD TEST: CPT

## 2018-09-11 PROCEDURE — 74011250636 HC RX REV CODE- 250/636

## 2018-09-11 PROCEDURE — 84132 ASSAY OF SERUM POTASSIUM: CPT | Performed by: UROLOGY

## 2018-09-11 RX ORDER — MAGNESIUM SULFATE 100 %
4 CRYSTALS MISCELLANEOUS AS NEEDED
Status: CANCELLED | OUTPATIENT
Start: 2018-09-11

## 2018-09-11 RX ORDER — FENTANYL CITRATE 50 UG/ML
25 INJECTION, SOLUTION INTRAMUSCULAR; INTRAVENOUS
Status: CANCELLED | OUTPATIENT
Start: 2018-09-11 | End: 2018-09-11

## 2018-09-11 RX ORDER — SODIUM CHLORIDE, SODIUM LACTATE, POTASSIUM CHLORIDE, CALCIUM CHLORIDE 600; 310; 30; 20 MG/100ML; MG/100ML; MG/100ML; MG/100ML
125 INJECTION, SOLUTION INTRAVENOUS CONTINUOUS
Status: DISCONTINUED | OUTPATIENT
Start: 2018-09-11 | End: 2018-09-11 | Stop reason: HOSPADM

## 2018-09-11 RX ORDER — INSULIN LISPRO 100 [IU]/ML
INJECTION, SOLUTION INTRAVENOUS; SUBCUTANEOUS ONCE
Status: CANCELLED | OUTPATIENT
Start: 2018-09-11 | End: 2018-09-11

## 2018-09-11 RX ORDER — ONDANSETRON 2 MG/ML
4 INJECTION INTRAMUSCULAR; INTRAVENOUS ONCE
Status: CANCELLED | OUTPATIENT
Start: 2018-09-11 | End: 2018-09-11

## 2018-09-11 RX ORDER — POTASSIUM CHLORIDE 1500 MG/1
TABLET, FILM COATED, EXTENDED RELEASE ORAL
Qty: 60 TAB | Refills: 1 | Status: SHIPPED | OUTPATIENT
Start: 2018-09-11 | End: 2019-01-17

## 2018-09-11 RX ORDER — NALOXONE HYDROCHLORIDE 0.4 MG/ML
0.1 INJECTION, SOLUTION INTRAMUSCULAR; INTRAVENOUS; SUBCUTANEOUS
Status: CANCELLED | OUTPATIENT
Start: 2018-09-11

## 2018-09-11 RX ORDER — CARVEDILOL 12.5 MG/1
12.5 TABLET ORAL ONCE
Status: DISCONTINUED | OUTPATIENT
Start: 2018-09-11 | End: 2018-09-11 | Stop reason: HOSPADM

## 2018-09-11 RX ORDER — SODIUM CHLORIDE 0.9 % (FLUSH) 0.9 %
5-10 SYRINGE (ML) INJECTION AS NEEDED
Status: CANCELLED | OUTPATIENT
Start: 2018-09-11

## 2018-09-11 RX ORDER — HYDROMORPHONE HYDROCHLORIDE 2 MG/ML
0.5 INJECTION, SOLUTION INTRAMUSCULAR; INTRAVENOUS; SUBCUTANEOUS
Status: CANCELLED | OUTPATIENT
Start: 2018-09-11

## 2018-09-11 RX ORDER — SODIUM CHLORIDE, SODIUM LACTATE, POTASSIUM CHLORIDE, CALCIUM CHLORIDE 600; 310; 30; 20 MG/100ML; MG/100ML; MG/100ML; MG/100ML
50 INJECTION, SOLUTION INTRAVENOUS CONTINUOUS
Status: CANCELLED | OUTPATIENT
Start: 2018-09-11

## 2018-09-11 RX ORDER — DEXTROSE 50 % IN WATER (D50W) INTRAVENOUS SYRINGE
25-50 AS NEEDED
Status: CANCELLED | OUTPATIENT
Start: 2018-09-11

## 2018-09-11 RX ORDER — OXYCODONE AND ACETAMINOPHEN 5; 325 MG/1; MG/1
1 TABLET ORAL AS NEEDED
Status: CANCELLED | OUTPATIENT
Start: 2018-09-11

## 2018-09-11 RX ADMIN — SODIUM CHLORIDE 1000 MG: 900 INJECTION, SOLUTION INTRAVENOUS at 06:45

## 2018-09-11 RX ADMIN — SODIUM CHLORIDE, SODIUM LACTATE, POTASSIUM CHLORIDE, AND CALCIUM CHLORIDE 125 ML/HR: 600; 310; 30; 20 INJECTION, SOLUTION INTRAVENOUS at 06:22

## 2018-09-11 NOTE — ANESTHESIA PREPROCEDURE EVALUATION
Anesthetic History No history of anesthetic complications Review of Systems / Medical History Patient summary reviewed, nursing notes reviewed and pertinent labs reviewed Pulmonary Sleep apnea Neuro/Psych Psychiatric history Cardiovascular Hypertension GI/Hepatic/Renal 
  
GERD Endo/Other Diabetes Morbid obesity Other Findings Physical Exam 
 
Airway Mallampati: II 
TM Distance: 4 - 6 cm Neck ROM: normal range of motion Mouth opening: Normal 
 
 Cardiovascular Regular rate and rhythm,  S1 and S2 normal,  no murmur, click, rub, or gallop Dental 
 
Dentition: Full upper dentures Pulmonary Breath sounds clear to auscultation Abdominal 
GI exam deferred Other Findings Anesthetic Plan ASA: 3 Anesthesia type: MAC Induction: Intravenous Anesthetic plan and risks discussed with: Family and Patient

## 2018-09-11 NOTE — PERIOP NOTES
Pt. Used restroom in pre-op area with assistance. Patient placed on [employer] Paws for a minimum of 30 min in  Preop.

## 2019-01-20 NOTE — H&P
[facility] [address] [place] [address]  [address] Tel: [phone] Fax: [phone] Patient: Delphine Blackwood YOB: 1955 Assessment/Plan # Detail Type Description 1. Assessment Urge incontinence (N39.41). Patient Plan He will continue Tolterodine. He needs an Interstim revision. His battery generator is out. We will do an explant of his generator and test the lead. Pending how the lead is we may explant the lead and implant to complete the new one or I may leave the existing one in place. Risk of bleeding, infection, pain, injury and need for future procedures were again discussed as possibilities. We will check a serum Potassium level today as Hypokalemia was the cause of him being cancelled a few months ago. This 59year old male presents for BPH and Hypogonadism. History of Present Illness: 1. BPH Onset was gradual. Severity level is moderate. It occurs daily. The problem is worse. Associated symptoms include incomplete emptying, nocturia, pressure, slow stream, splitting stream, urgency, urinary incontinence, dribbling and urinary frequency. Pertinent negatives include chills, constipation and fever. Additional information: He still has a slow stream.  He still has poor emptying. He has urgency and urge incontinence. His cysto showed a surpringly unobstructive prostate. He started [place] and Myrbetriq and improved but still wore diapers. He is now just on uroxatral and toviaz and wears 3 diapers per day. Myrbetriq isn't covered. His insurance wouldn't cover botox. He had an Interstim on 12/22/16. [place] Ring 2/2/108 -- Currently, he is on program 3 and is doing okay with improvement form baseline but he still needs 3 diapers per day.  [id number]. 
 
6/2018 - He wears a diaper and can often go a day with changing it 1 time per day. At times, he wears 2-3 pads per day. Most of the time he does pretty well and the urge will subside. , but then he is a  and at times he gets stuck and can''''t get to a bathroom. Flow is usually good, but other times it can be slow. He has no noctruia and doesn''''t leak at night. He voids about every 3 hours by day. 8/2018 -- He is now wearing about 3-4 pads per day. No pain or radiation. He has lost a lot of weight and feels like the generator sits differently, but the battery is dead. 12.2018 -- He has been doing poorly. He was supposed to have a battery replaced, but he had hypokalemia and his case was cancelled. He is back to wearing 4 diapers per day. He uses rthe toleterodine and that hel0ps a little. His battery is just nonfunctional. 
2.  Hypogonadism The symptoms began gradually, have been moderate and are improving. The patient is here today for a follow up visit. The patient states he does have difficultly attaining an erection and has difficulty maintaining an erection. The adequacy of his erection measures 70.00% rigidity. Pertinent history does not include diabetes, hypercholesterolemia, hyperlipidemia, hypertension, neurologic disease, pelvic surgery, penile fracture or prostate cancer. He denies depression. Additional information: + fatigue, + decreased energy T = 121 (7/2013)  No problems with erections, low libidio. (he was given axiron and feels somewhat better. On fortesta he has imporved energy and T = 426 (10/2013)   He is better with improved enrgy and he feels well, but his insurance coverage won't cover it -- On injections he does well T = 818 (7/2/15) and HCt = 43 and PSA = 1.01 and he remians very happy. 8/2018 -- His energy is pretty good and he feels well. He is losing weight and has more strength and stamina. T= 330 (6/2018) 
 
12/2018 -- He was losing weight but lately is gaining it back. Ahsan Nina PAST MEDICAL/SURGICAL HISTORY   (Reviewed, updated) Disease/disorder Onset Date Management Date Comments  
  interstim 12/22/2015 Anxiety Depression HTN Hyperlipidemia THOMAS  C-PAP    
  hernia repair x 2 PROBLEM LIST:   Problem List reviewed. Problem Description Onset Date Chronic Clinical Status Notes BMI 40.0-44.9, adult 02/22/2016 Y Obesity 03/04/2011 Y Vitamin D deficiency 03/04/2011 Y Hyperlipidemia 03/04/2011 Y Benign essential hypertension 03/04/2011 Y Benign prostatic hyperplasia 07/17/2013 Y Testicular hypofunction 07/17/2013 Y Medications (active prior to today) Medication Instructions Start Date Stop Date Refilled Elsewhere  
aspirin 81 mg Tab take 1 tablet (81MG)  by oral route  every day 03/04/2011   N  
omeprazole 20 mg tablet,delayed release take 1 Tablet by Oral route once 12/22/2016   N  
tramadol 50 mg tablet take 1 tablet by oral route  every 8 hours as needed 06/07/2017   N  
tolterodine ER 4 mg capsule,extended release 24 hr take 1 capsule by oral route  every day 12/20/2017 12/26/2018 12/26/2018 N  
alfuzosin ER 10 mg tablet,extended release 24 hr take 1 tablet by oral route  every day 12/20/2017 12/26/2018 12/26/2018 N  
sildenafil (antihypertensive) 20 mg tablet take 1 to 5 tablet by oral route 1 hour prior to sex 02/22/2018   N Effexor  mg capsule,extended release take 1 capsule by oral route  every day 04/06/2018 12/27/2018 12/27/2018 N Coreg 12.5 mg tablet TAKE ONE TABLET BY MOUTH TWICE DAILY WITH FOOD 05/14/2018 05/14/2018 N  
testosterone cypionate 200 mg/mL intramuscular oil inject 2 milliliter by intramuscular route  every 7 days 06/22/2018 12/26/2018 12/26/2018 N Syringe 3 mL 21 gauge x 1 1/2\" inject by Intramuscular route for Testosterone inj every week 06/22/2018 06/22/2018 N  
ibuprofen 800 mg tablet TAKE 1 TABLET BY MOUTH THREE TIMES DAILY WITH FOOD AS NEEDED 09/12/2018 09/12/2018 N  
 Diovan  mg-25 mg tablet take 1 tablet by oral route  every day 10/24/2018  10/24/2018 N  
amlodipine 10 mg tablet take 1 tablet (10MG)  by oral route  every day 11/08/2018 11/08/2018 N  
metformin 1,000 mg tablet take 1 tablet by oral route 2 times every day with morning and evening meals 11/13/2018 11/13/2018 N Lasix 20 mg tablet take 1 Tablet by Oral route  every day 12/17/2018 12/17/2018 N Medication Reconciliation Medications reconciled today. Medication Reviewed Adherence Medication Name Sig Desc Elsewhere Status  
taking as directed aspirin 81 mg Tab take 1 tablet (81MG)  by oral route  every day N Verified  
taking as directed omeprazole 20 mg tablet,delayed release take 1 Tablet by Oral route once N Verified  
taking as directed tolterodine ER 4 mg capsule,extended release 24 hr take 1 capsule by oral route  every day N Verified  
taking as directed tramadol 50 mg tablet take 1 tablet by oral route  every 8 hours as needed N Verified  
taking as directed alfuzosin ER 10 mg tablet,extended release 24 hr take 1 tablet by oral route  every day N Verified  
taking as directed sildenafil (antihypertensive) 20 mg tablet take 1 to 5 tablet by oral route 1 hour prior to sex N Verified  
taking as directed Effexor  mg capsule,extended release take 1 capsule by oral route  every day N Verified  
taking as directed Coreg 12.5 mg tablet TAKE ONE TABLET BY MOUTH TWICE DAILY WITH FOOD N Verified  
taking as directed Syringe 3 mL 21 gauge x 1 1/2\" inject by Intramuscular route for Testosterone inj every week N Verified  
taking as directed testosterone cypionate 200 mg/mL intramuscular oil inject 2 milliliter by intramuscular route  every 7 days N Verified  
taking as directed ibuprofen 800 mg tablet TAKE 1 TABLET BY MOUTH THREE TIMES DAILY WITH FOOD AS NEEDED N Verified  
taking as directed Diovan  mg-25 mg tablet take 1 tablet by oral route  every day N Verified taking as directed amlodipine 10 mg tablet take 1 tablet (10MG)  by oral route  every day N Verified  
taking as directed metformin 1,000 mg tablet take 1 tablet by oral route 2 times every day with morning and evening meals N Verified  
taking as directed Lasix 20 mg tablet take 1 Tablet by Oral route  every day N Verified Allergies Ingredient Reaction (Severity) Medication Name Comment NO KNOWN ALLERGIES Reviewed, no changes. Family History  (Reviewed, updated) Relationship Family Member Name  Age at Death Condition Onset Age Cause of Death Father  N  cancer, unknown type  N Social History:  (Reviewed, updated) Tobacco use reviewed. The patient is right-handed. Preferred language is [place]. The patient does not need an . MARITAL STATUS/FAMILY/SOCIAL SUPPORT Currently . Tobacco use status: Never smoked tobacco. 
Smoking status: Never smoker. SMOKING STATUS Use Status Type Smoking Status Usage Per Day Years Used Total Pack Years  
no/never  Never smoker TOBACCO/VAPING EXPOSURE No passive smoke exposure. ALCOHOL There is a history of alcohol use. consumed rarely. CAFFEINE The patient uses caffeine: coffee - 4 cups a day. Worship/SPIRITUAL Patient agrees to transfusion. Review of Systems System Neg/Pos Details Constitutional Negative Chills and Fever. ENMT Negative Ear infections and Sore throat. Eyes Negative Blurred vision, Double vision and Eye pain. Respiratory Negative Asthma, Chronic cough, Dyspnea and Wheezing. Cardio Negative Chest pain. GI Negative Constipation, Decreased appetite, Diarrhea, Nausea and Vomiting.  Positive Incomplete emptying, Nocturia, Pressure, Slow stream, Splitting stream, Urgency, Urinary dribbling, Urinary frequency, Urinary incontinence. Endocrine Negative Cold intolerance, Heat intolerance, Increased thirst and Weight loss. Neuro Negative Headache and Tremors. Psych Negative Anxiety and Depression. Integumentary Negative Itching skin and Rash. MS Negative Back pain and Joint pain. Hema/Lymph Negative Easy bleeding. Reproductive Negative Sexual dysfunction. Vital Signs Height Time ft in cm Last Measured Height Position 9:50 AM 6.0 0.00 182.88 06/25/2013 0 Weight/BSA/BMI Time lb oz kg Context BMI kg/m2 BSA m2  
9:50 .20  127.550  38.14 Temperature/Pulse/Respiration Time Temp F Temp C Temp Site Pulse/min Pattern Resp/ min 9:50 AM 97.20 36.22 Measured By Time Measured by  
9:50 AM Erin Adams Physical Exam 
Exam Findings Details Constitutional Normal Well developed. Neck Exam Normal Inspection - Normal.  
Respiratory Normal Inspection - Normal.  
Abdomen Normal No abdominal tenderness. Genitourinary Normal No CVA tenderness. No suprapubic tenderness. Extremity Normal No edema. Psychiatric Normal Orientation - Oriented to time, place, person & situation. Appropriate mood and affect. Patient Education # Patient Education 1. Benign Prostatic Hyperplasia: Care In~ Medications (added, continued, or stopped today) Start Date Medication Directions PRN Status PRN Reason Instruction Stop Date  
12/20/2017 alfuzosin ER 10 mg tablet,extended release 24 hr take 1 tablet by oral route  every day N   12/26/2018 12/26/2018 alfuzosin ER 10 mg tablet,extended release 24 hr take 1 tablet by oral route  every day N     
11/08/2018 amlodipine 10 mg tablet take 1 tablet (10MG)  by oral route  every day N     
03/04/2011 aspirin 81 mg Tab take 1 tablet (81MG)  by oral route  every day N     
05/14/2018 Coreg 12.5 mg tablet TAKE ONE TABLET BY MOUTH TWICE DAILY WITH FOOD N     
10/24/2018 Diovan  mg-25 mg tablet take 1 tablet by oral route  every day N     
04/06/2018 Effexor  mg capsule,extended release take 1 capsule by oral route  every day N   12/27/2018 12/27/2018 Effexor  mg capsule,extended release take 1 capsule by oral route  every day N     
09/12/2018 ibuprofen 800 mg tablet TAKE 1 TABLET BY MOUTH THREE TIMES DAILY WITH FOOD AS NEEDED N     
12/17/2018 Lasix 20 mg tablet take 1 Tablet by Oral route  every day N     
11/13/2018 metformin 1,000 mg tablet take 1 tablet by oral route 2 times every day with morning and evening meals N     
12/22/2016 omeprazole 20 mg tablet,delayed release take 1 Tablet by Oral route once N     
02/22/2018 sildenafil (antihypertensive) 20 mg tablet take 1 to 5 tablet by oral route 1 hour prior to sex N     
06/22/2018 Syringe 3 mL 21 gauge x 1 1/2\" inject by Intramuscular route for Testosterone inj every week N     
12/26/2018 testosterone cypionate 200 mg/mL intramuscular oil inject 2 milliliter by intramuscular route  every 7 days N     
06/22/2018 testosterone cypionate 200 mg/mL intramuscular oil inject 2 milliliter by intramuscular route  every 7 days N   12/26/2018 12/20/2017 tolterodine ER 4 mg capsule,extended release 24 hr take 1 capsule by oral route  every day N   12/26/2018 12/26/2018 tolterodine ER 4 mg capsule,extended release 24 hr take 1 capsule by oral route  every day N     
06/07/2017 tramadol 50 mg tablet take 1 tablet by oral route  every 8 hours as needed N Active Patient Care Team Members Name Contact Agency Type Support Role Relationship Active Date Inactive Date Specialty [place] [phone]   Patient provider PCP   [place] [address]   primary care provider    Family Pract Jaylin Noel   encounter provider    [facility] [address] Provider:  
 
 
Mónica Jimenes MD

## 2019-01-21 ENCOUNTER — HOSPITAL ENCOUNTER (OUTPATIENT)
Dept: PREADMISSION TESTING | Age: 64
Discharge: HOME | End: 2019-01-21
Payer: COMMERCIAL

## 2019-01-21 DIAGNOSIS — Z01.818 PREOP EXAMINATION: ICD-10-CM

## 2019-01-21 PROCEDURE — 93005 ELECTROCARDIOGRAM TRACING: CPT

## 2019-01-22 ENCOUNTER — HOSPITAL ENCOUNTER (OUTPATIENT)
Age: 64
Setting detail: OUTPATIENT SURGERY
Discharge: HOME | End: 2019-01-22
Attending: UROLOGY | Admitting: UROLOGY
Payer: COMMERCIAL

## 2019-01-22 ENCOUNTER — APPOINTMENT (OUTPATIENT)
Dept: GENERAL RADIOLOGY | Age: 64
End: 2019-01-22
Attending: UROLOGY
Payer: COMMERCIAL

## 2019-01-22 ENCOUNTER — ANESTHESIA EVENT (OUTPATIENT)
Dept: SURGERY | Age: 64
End: 2019-01-22
Payer: COMMERCIAL

## 2019-01-22 ENCOUNTER — ANESTHESIA (OUTPATIENT)
Dept: SURGERY | Age: 64
End: 2019-01-22
Payer: COMMERCIAL

## 2019-01-22 VITALS
OXYGEN SATURATION: 100 % | TEMPERATURE: 97.7 F | RESPIRATION RATE: 16 BRPM | WEIGHT: 269.38 LBS | BODY MASS INDEX: 36.49 KG/M2 | HEART RATE: 53 BPM | HEIGHT: 72 IN | DIASTOLIC BLOOD PRESSURE: 78 MMHG | SYSTOLIC BLOOD PRESSURE: 148 MMHG

## 2019-01-22 DIAGNOSIS — N39.41 URGE INCONTINENCE OF URINE: Primary | ICD-10-CM

## 2019-01-22 LAB
ATRIAL RATE: 82 BPM
CALCULATED P AXIS, ECG09: 61 DEGREES
CALCULATED R AXIS, ECG10: 70 DEGREES
CALCULATED T AXIS, ECG11: 35 DEGREES
DIAGNOSIS, 93000: NORMAL
GLUCOSE BLD STRIP.AUTO-MCNC: 94 MG/DL (ref 70–110)
GLUCOSE BLD STRIP.AUTO-MCNC: 99 MG/DL (ref 70–110)
P-R INTERVAL, ECG05: 194 MS
Q-T INTERVAL, ECG07: 366 MS
QRS DURATION, ECG06: 106 MS
QTC CALCULATION (BEZET), ECG08: 427 MS
VENTRICULAR RATE, ECG03: 82 BPM

## 2019-01-22 PROCEDURE — 74011250636 HC RX REV CODE- 250/636

## 2019-01-22 PROCEDURE — 77030035549 HC NEUROSTIM EXT VERIFY DISP MEDT -E: Performed by: UROLOGY

## 2019-01-22 PROCEDURE — 77030031139 HC SUT VCRL2 J&J -A: Performed by: UROLOGY

## 2019-01-22 PROCEDURE — 77030029091 HC ACC NEURSTIM KT MEDT -B: Performed by: UROLOGY

## 2019-01-22 PROCEDURE — C1894 INTRO/SHEATH, NON-LASER: HCPCS | Performed by: UROLOGY

## 2019-01-22 PROCEDURE — 77030038906 HC CBL EXT TRL INTERSTIM MEDT -B: Performed by: UROLOGY

## 2019-01-22 PROCEDURE — 76210000021 HC REC RM PH II 0.5 TO 1 HR: Performed by: UROLOGY

## 2019-01-22 PROCEDURE — 74011000250 HC RX REV CODE- 250: Performed by: UROLOGY

## 2019-01-22 PROCEDURE — 77030032490 HC SLV COMPR SCD KNE COVD -B: Performed by: UROLOGY

## 2019-01-22 PROCEDURE — 76060000034 HC ANESTHESIA 1.5 TO 2 HR: Performed by: UROLOGY

## 2019-01-22 PROCEDURE — C1778 LEAD, NEUROSTIMULATOR: HCPCS | Performed by: UROLOGY

## 2019-01-22 PROCEDURE — 77030012020 HC ANTENA NEURSTM MEDT -B: Performed by: UROLOGY

## 2019-01-22 PROCEDURE — 77030013079 HC BLNKT BAIR HGGR 3M -A: Performed by: ANESTHESIOLOGY

## 2019-01-22 PROCEDURE — C1767 GENERATOR, NEURO NON-RECHARG: HCPCS | Performed by: UROLOGY

## 2019-01-22 PROCEDURE — 82962 GLUCOSE BLOOD TEST: CPT

## 2019-01-22 PROCEDURE — 74011250636 HC RX REV CODE- 250/636: Performed by: UROLOGY

## 2019-01-22 PROCEDURE — 76010000153 HC OR TIME 1.5 TO 2 HR: Performed by: UROLOGY

## 2019-01-22 PROCEDURE — 76210000063 HC OR PH I REC FIRST 0.5 HR: Performed by: UROLOGY

## 2019-01-22 PROCEDURE — 74011000272 HC RX REV CODE- 272: Performed by: UROLOGY

## 2019-01-22 PROCEDURE — 77030020788: Performed by: UROLOGY

## 2019-01-22 PROCEDURE — 77030018836 HC SOL IRR NACL ICUM -A: Performed by: UROLOGY

## 2019-01-22 PROCEDURE — 77030020782 HC GWN BAIR PAWS FLX 3M -B: Performed by: UROLOGY

## 2019-01-22 PROCEDURE — 74011000250 HC RX REV CODE- 250

## 2019-01-22 PROCEDURE — 74011250637 HC RX REV CODE- 250/637: Performed by: ANESTHESIOLOGY

## 2019-01-22 PROCEDURE — C1787 PATIENT PROGR, NEUROSTIM: HCPCS | Performed by: UROLOGY

## 2019-01-22 DEVICE — GENERATOR INTERSTIM X --: Type: IMPLANTABLE DEVICE | Site: BACK | Status: FUNCTIONAL

## 2019-01-22 DEVICE — LEAD KT STIM INTERSTIM 28CM --: Type: IMPLANTABLE DEVICE | Site: BACK | Status: FUNCTIONAL

## 2019-01-22 RX ORDER — CEFAZOLIN SODIUM 1 G/3ML
INJECTION, POWDER, FOR SOLUTION INTRAMUSCULAR; INTRAVENOUS AS NEEDED
Status: DISCONTINUED | OUTPATIENT
Start: 2019-01-22 | End: 2019-01-22 | Stop reason: HOSPADM

## 2019-01-22 RX ORDER — PROPOFOL 10 MG/ML
INJECTION, EMULSION INTRAVENOUS AS NEEDED
Status: DISCONTINUED | OUTPATIENT
Start: 2019-01-22 | End: 2019-01-22 | Stop reason: HOSPADM

## 2019-01-22 RX ORDER — PROPOFOL 10 MG/ML
INJECTION, EMULSION INTRAVENOUS
Status: DISCONTINUED | OUTPATIENT
Start: 2019-01-22 | End: 2019-01-22 | Stop reason: HOSPADM

## 2019-01-22 RX ORDER — MIDAZOLAM HYDROCHLORIDE 1 MG/ML
INJECTION, SOLUTION INTRAMUSCULAR; INTRAVENOUS AS NEEDED
Status: DISCONTINUED | OUTPATIENT
Start: 2019-01-22 | End: 2019-01-22 | Stop reason: HOSPADM

## 2019-01-22 RX ORDER — LIDOCAINE HYDROCHLORIDE 20 MG/ML
INJECTION, SOLUTION EPIDURAL; INFILTRATION; INTRACAUDAL; PERINEURAL AS NEEDED
Status: DISCONTINUED | OUTPATIENT
Start: 2019-01-22 | End: 2019-01-22 | Stop reason: HOSPADM

## 2019-01-22 RX ORDER — TRAMADOL HYDROCHLORIDE 50 MG/1
50 TABLET ORAL
Qty: 20 TAB | Refills: 0 | Status: SHIPPED | OUTPATIENT
Start: 2019-01-22

## 2019-01-22 RX ORDER — INSULIN LISPRO 100 [IU]/ML
INJECTION, SOLUTION INTRAVENOUS; SUBCUTANEOUS ONCE
Status: DISCONTINUED | OUTPATIENT
Start: 2019-01-22 | End: 2019-01-22 | Stop reason: HOSPADM

## 2019-01-22 RX ORDER — SODIUM CHLORIDE, SODIUM LACTATE, POTASSIUM CHLORIDE, CALCIUM CHLORIDE 600; 310; 30; 20 MG/100ML; MG/100ML; MG/100ML; MG/100ML
50 INJECTION, SOLUTION INTRAVENOUS CONTINUOUS
Status: DISCONTINUED | OUTPATIENT
Start: 2019-01-22 | End: 2019-01-22 | Stop reason: HOSPADM

## 2019-01-22 RX ORDER — OXYCODONE AND ACETAMINOPHEN 5; 325 MG/1; MG/1
1 TABLET ORAL AS NEEDED
Status: DISCONTINUED | OUTPATIENT
Start: 2019-01-22 | End: 2019-01-22 | Stop reason: HOSPADM

## 2019-01-22 RX ORDER — SODIUM CHLORIDE, SODIUM LACTATE, POTASSIUM CHLORIDE, CALCIUM CHLORIDE 600; 310; 30; 20 MG/100ML; MG/100ML; MG/100ML; MG/100ML
125 INJECTION, SOLUTION INTRAVENOUS CONTINUOUS
Status: DISCONTINUED | OUTPATIENT
Start: 2019-01-22 | End: 2019-01-22 | Stop reason: HOSPADM

## 2019-01-22 RX ORDER — FENTANYL CITRATE 50 UG/ML
25 INJECTION, SOLUTION INTRAMUSCULAR; INTRAVENOUS
Status: ACTIVE | OUTPATIENT
Start: 2019-01-22 | End: 2019-01-22

## 2019-01-22 RX ORDER — LABETALOL HCL 20 MG/4 ML
SYRINGE (ML) INTRAVENOUS AS NEEDED
Status: DISCONTINUED | OUTPATIENT
Start: 2019-01-22 | End: 2019-01-22 | Stop reason: HOSPADM

## 2019-01-22 RX ORDER — ONDANSETRON 2 MG/ML
INJECTION INTRAMUSCULAR; INTRAVENOUS AS NEEDED
Status: DISCONTINUED | OUTPATIENT
Start: 2019-01-22 | End: 2019-01-22 | Stop reason: HOSPADM

## 2019-01-22 RX ORDER — GLYCOPYRROLATE 0.2 MG/ML
INJECTION INTRAMUSCULAR; INTRAVENOUS AS NEEDED
Status: DISCONTINUED | OUTPATIENT
Start: 2019-01-22 | End: 2019-01-22 | Stop reason: HOSPADM

## 2019-01-22 RX ORDER — NALOXONE HYDROCHLORIDE 0.4 MG/ML
0.1 INJECTION, SOLUTION INTRAMUSCULAR; INTRAVENOUS; SUBCUTANEOUS
Status: DISCONTINUED | OUTPATIENT
Start: 2019-01-22 | End: 2019-01-22 | Stop reason: HOSPADM

## 2019-01-22 RX ORDER — LIDOCAINE HYDROCHLORIDE 20 MG/ML
JELLY TOPICAL AS NEEDED
Status: DISCONTINUED | OUTPATIENT
Start: 2019-01-22 | End: 2019-01-22 | Stop reason: HOSPADM

## 2019-01-22 RX ORDER — MAGNESIUM SULFATE 100 %
4 CRYSTALS MISCELLANEOUS AS NEEDED
Status: DISCONTINUED | OUTPATIENT
Start: 2019-01-22 | End: 2019-01-22 | Stop reason: HOSPADM

## 2019-01-22 RX ORDER — DEXTROSE 50 % IN WATER (D50W) INTRAVENOUS SYRINGE
25-50 AS NEEDED
Status: DISCONTINUED | OUTPATIENT
Start: 2019-01-22 | End: 2019-01-22 | Stop reason: HOSPADM

## 2019-01-22 RX ORDER — CEPHALEXIN 500 MG/1
500 CAPSULE ORAL 4 TIMES DAILY
Qty: 12 CAP | Refills: 0 | Status: SHIPPED | OUTPATIENT
Start: 2019-01-22 | End: 2019-01-25

## 2019-01-22 RX ORDER — HYDROMORPHONE HYDROCHLORIDE 2 MG/ML
0.5 INJECTION, SOLUTION INTRAMUSCULAR; INTRAVENOUS; SUBCUTANEOUS
Status: DISCONTINUED | OUTPATIENT
Start: 2019-01-22 | End: 2019-01-22 | Stop reason: HOSPADM

## 2019-01-22 RX ORDER — ONDANSETRON 2 MG/ML
4 INJECTION INTRAMUSCULAR; INTRAVENOUS ONCE
Status: DISCONTINUED | OUTPATIENT
Start: 2019-01-22 | End: 2019-01-22 | Stop reason: HOSPADM

## 2019-01-22 RX ORDER — KETAMINE HYDROCHLORIDE 10 MG/ML
INJECTION, SOLUTION INTRAMUSCULAR; INTRAVENOUS AS NEEDED
Status: DISCONTINUED | OUTPATIENT
Start: 2019-01-22 | End: 2019-01-22 | Stop reason: HOSPADM

## 2019-01-22 RX ADMIN — ONDANSETRON 4 MG: 2 INJECTION INTRAMUSCULAR; INTRAVENOUS at 08:15

## 2019-01-22 RX ADMIN — Medication 5 MG: at 07:50

## 2019-01-22 RX ADMIN — SODIUM CHLORIDE, SODIUM LACTATE, POTASSIUM CHLORIDE, AND CALCIUM CHLORIDE: 600; 310; 30; 20 INJECTION, SOLUTION INTRAVENOUS at 07:55

## 2019-01-22 RX ADMIN — LIDOCAINE HYDROCHLORIDE 50 MG: 20 INJECTION, SOLUTION EPIDURAL; INFILTRATION; INTRACAUDAL; PERINEURAL at 07:37

## 2019-01-22 RX ADMIN — LIDOCAINE HYDROCHLORIDE 3 ML: 20 JELLY TOPICAL at 07:41

## 2019-01-22 RX ADMIN — MIDAZOLAM HYDROCHLORIDE 4 MG: 1 INJECTION, SOLUTION INTRAMUSCULAR; INTRAVENOUS at 07:25

## 2019-01-22 RX ADMIN — LIDOCAINE HYDROCHLORIDE 40 MG: 20 INJECTION, SOLUTION EPIDURAL; INFILTRATION; INTRACAUDAL; PERINEURAL at 07:33

## 2019-01-22 RX ADMIN — KETAMINE HYDROCHLORIDE 10 MG: 10 INJECTION, SOLUTION INTRAMUSCULAR; INTRAVENOUS at 07:33

## 2019-01-22 RX ADMIN — KETAMINE HYDROCHLORIDE 20 MG: 10 INJECTION, SOLUTION INTRAMUSCULAR; INTRAVENOUS at 07:46

## 2019-01-22 RX ADMIN — PROPOFOL 50 MG: 10 INJECTION, EMULSION INTRAVENOUS at 07:33

## 2019-01-22 RX ADMIN — PROPOFOL 150 MCG/KG/MIN: 10 INJECTION, EMULSION INTRAVENOUS at 07:37

## 2019-01-22 RX ADMIN — GLYCOPYRROLATE 0.2 MG: 0.2 INJECTION INTRAMUSCULAR; INTRAVENOUS at 07:25

## 2019-01-22 RX ADMIN — Medication 5 MG: at 08:28

## 2019-01-22 RX ADMIN — KETAMINE HYDROCHLORIDE 10 MG: 10 INJECTION, SOLUTION INTRAMUSCULAR; INTRAVENOUS at 07:37

## 2019-01-22 RX ADMIN — OXYCODONE AND ACETAMINOPHEN 1 TABLET: 5; 325 TABLET ORAL at 10:03

## 2019-01-22 RX ADMIN — CEFAZOLIN SODIUM 3 G: 1 INJECTION, POWDER, FOR SOLUTION INTRAMUSCULAR; INTRAVENOUS at 07:47

## 2019-01-22 RX ADMIN — SODIUM CHLORIDE, SODIUM LACTATE, POTASSIUM CHLORIDE, AND CALCIUM CHLORIDE 125 ML/HR: 600; 310; 30; 20 INJECTION, SOLUTION INTRAVENOUS at 06:45

## 2019-01-22 RX ADMIN — PROPOFOL 30 MG: 10 INJECTION, EMULSION INTRAVENOUS at 07:35

## 2019-01-22 RX ADMIN — KETAMINE HYDROCHLORIDE 10 MG: 10 INJECTION, SOLUTION INTRAMUSCULAR; INTRAVENOUS at 08:12

## 2019-01-22 NOTE — ANESTHESIA PREPROCEDURE EVALUATION
Anesthetic History No history of anesthetic complications Review of Systems / Medical History Patient summary reviewed, nursing notes reviewed and pertinent labs reviewed Pulmonary Sleep apnea Neuro/Psych Psychiatric history Cardiovascular Hypertension GI/Hepatic/Renal 
  
GERD Endo/Other Diabetes Morbid obesity and arthritis Other Findings Physical Exam 
 
Airway Mallampati: II 
TM Distance: 4 - 6 cm Neck ROM: normal range of motion Mouth opening: Normal 
 
 Cardiovascular Regular rate and rhythm,  S1 and S2 normal,  no murmur, click, rub, or gallop Dental 
 
Dentition: Upper partial plate Pulmonary Breath sounds clear to auscultation Abdominal 
GI exam deferred Other Findings Anesthetic Plan ASA: 3 Anesthesia type: MAC Induction: Intravenous Anesthetic plan and risks discussed with: Patient

## 2019-01-22 NOTE — ANESTHESIA POSTPROCEDURE EVALUATION
Procedure(s): EXPLANT OF INTERSTIM BATTERY AND POSSIBLE EXPLANT OF LEAD. IMPLANT OF NEW INTERSTIM BATTERY AND NEW LEAD IMPLANT WITH C-ARM, INTRAOPERATIVE PROGRAMMING. Anesthesia Post Evaluation Pain management: adequate Airway patency: patent Anesthetic complications: no 
Cardiovascular status: acceptable Respiratory status: acceptable Hydration status: acceptable Post anesthesia nausea and vomiting:  controlled Visit Vitals /90 Pulse 67 Temp 36.7 °C (98 °F) Resp 15 Ht 6' (1.829 m) Wt 122.2 kg (269 lb 6 oz) SpO2 97% BMI 36.53 kg/m²

## 2019-01-22 NOTE — DISCHARGE INSTRUCTIONS
No showers before Friday -- sponge baths only    On Friday morning, remove dressing and shower. The steri strip tapes will fall off on their own over time    No baths or swimming for 5 weeks    No strenuous exercise for 4 weeks    No driving or operating machinery while taking narcotic pain meds    Restart ibuprofen on Friday    Start Cephalexin antibiotic today at lunch  DISCHARGE SUMMARY from Nurse    PATIENT INSTRUCTIONS:    After general anesthesia or intravenous sedation, for 24 hours or while taking prescription Narcotics:  · Limit your activities  · Do not drive and operate hazardous machinery  · Do not make important personal or business decisions  · Do  not drink alcoholic beverages  · If you have not urinated within 8 hours after discharge, please contact your surgeon on call. Report the following to your surgeon:  · Excessive pain, swelling, redness or odor of or around the surgical area  · Temperature over 100.5  · Nausea and vomiting lasting longer than 4 hours or if unable to take medications  · Any signs of decreased circulation or nerve impairment to extremity: change in color, persistent  numbness, tingling, coldness or increase pain  · Any questions    What to do at Home:  Recommended activity: Activity as tolerated and no driving for today,     If you experience any of the following symptoms as above, please follow up with Dr. Leoncio Kauffman at [phone]    *  Please give a list of your current medications to your Primary Care Provider. *  Please update this list whenever your medications are discontinued, doses are      changed, or new medications (including over-the-counter products) are added. *  Please carry medication information at all times in case of emergency situations.     These are general instructions for a healthy lifestyle:    No smoking/ No tobacco products/ Avoid exposure to second hand smoke  Surgeon General's Warning:  Quitting smoking now greatly reduces serious risk to your health. Obesity, smoking, and sedentary lifestyle greatly increases your risk for illness    A healthy diet, regular physical exercise & weight monitoring are important for maintaining a healthy lifestyle    You may be retaining fluid if you have a history of heart failure or if you experience any of the following symptoms:  Weight gain of 3 pounds or more overnight or 5 pounds in a week, increased swelling in our hands or feet or shortness of breath while lying flat in bed. Please call your doctor as soon as you notice any of these symptoms; do not wait until your next office visit. Recognize signs and symptoms of STROKE:    F-face looks uneven    A-arms unable to move or move unevenly    S-speech slurred or non-existent    T-time-call 911 as soon as signs and symptoms begin-DO NOT go       Back to bed or wait to see if you get better-TIME IS BRAIN. Warning Signs of HEART ATTACK     Call 911 if you have these symptoms:   Chest discomfort. Most heart attacks involve discomfort in the center of the chest that lasts more than a few minutes, or that goes away and comes back. It can feel like uncomfortable pressure, squeezing, fullness, or pain.  Discomfort in other areas of the upper body. Symptoms can include pain or discomfort in one or both arms, the back, neck, jaw, or stomach.  Shortness of breath with or without chest discomfort.  Other signs may include breaking out in a cold sweat, nausea, or lightheadedness. Don't wait more than five minutes to call 911 - MINUTES MATTER! Fast action can save your life. Calling 911 is almost always the fastest way to get lifesaving treatment. Emergency Medical Services staff can begin treatment when they arrive -- up to an hour sooner than if someone gets to the hospital by car. Patient armband removed and shredded  The discharge information has been reviewed with the patient and caregiver. The patient and caregiver verbalized understanding.   Discharge medications reviewed with the patient and caregiver and appropriate educational materials and side effects teaching were provided.   ___________________________________________________________________________________________________________________________________

## 2019-01-22 NOTE — INTERVAL H&P NOTE
H&P Update: 
Cecilio Raymundo was seen and examined. History and physical has been reviewed. The patient has been examined.  There have been no significant clinical changes since the completion of the originally dated History and Physical. 
 
Signed By: Amee Berrios MD   
 January 22, 2019 7:20 AM

## 2019-01-22 NOTE — BRIEF OP NOTE
BRIEF OPERATIVE NOTE Date of Procedure: 1/22/2019 Preoperative Diagnosis: URGE INCONTINENCE, BREAKDOWN OF URINARY ELECTRONIC STIMULATOR DEVICE Postoperative Diagnosis: URGE INCONTINENCE, BREAKDOWN OF URINARY ELECTRONIC STIMULATOR DEVICE Procedure(s): EXPLANT OF INTERSTIM BATTERY. IMPLANT OF NEW INTERSTIM BATTERY AND NEW LEAD IMPLANT WITH C-ARM, INTRAOPERATIVE PROGRAMMING and FLUOROSCOPY Surgeon(s) and Role: 
   * Rhina Mcintyre MD - Primary Surgical Assistant: NONE Surgical Staff: 
Circ-1: Kilo Bahena Scrub Tech-1: Chiquita Osler Scrub Tech-2: [place] Event Time In Time Out Incision Start 2860 Incision Close 9749 Anesthesia: MAC Estimated Blood Loss: 5ML Specimens: * No specimens in log * Findings: OLD IMPLANT HAD BEEN PULLED BACK INTO THE FORAMEN QUITE A BIT AND MINIMAL REFLEXES WERE SEEN WITH STIMULATION OF THE LEAD AT ALL CONTACT POINTS. A NEW LEAD WAS PLACED ON THE RIGHT SIDE. EXCELLENT GUERRERO AND TOE MOVEMENT WAS SEEN AT ALL CONTACT POINTS AT LOW AMPLITUDE. Complications: NONE Implants:  
Implant Name Type Inv. Item Serial No.  Lot No. LRB No. Used Action GENERATOR INTERSTIM II IPG --  - [id number]  GENERATOR INTERSTIM II IPG --  [id number] [employer]  Left 1 Implanted LEAD KT STIM INTERSTIM 28CM --  - [id number]  LEAD KT STIM INTERSTIM 28CM --   [employer] [id number] 1 Implanted

## 2019-01-22 NOTE — OP NOTES
[facility]  OPERATIVE REPORT    Adilia Raymundo  MR#: [id number]  : 1955  ACCOUNT #: [de-identified]   DATE OF SERVICE: 2019    PREOPERATIVE DIAGNOSES:  Urge incontinence, breakdown of urinary electronic stimulator device. POSTOPERATIVE DIAGNOSES:  Urge incontinence, breakdown of urinary electronic stimulator device. PROCEDURE PERFORMED:  Explantation of InterStim battery, implant of new InterStim battery, and new InterStim lead with intraoperative fluoroscopy and intraoperative programming. SURGEON:  Maribell Farley MD    ASSISTANT:  None. ANESTHESIA:  MAC.    ESTIMATED BLOOD LOSS:  5 mL. SPECIMENS REMOVED:  None. FINDINGS:  The patient's old implant lead had been pulled back into the foramen on the left side quite a bit, and there were minimal reflexes seen with stimulation of the lead at all contact points. A new lead was placed on the right side. Excellent bellow and toe movement was seen at all contact points at a very low amplitude with the new lead on the right. COMPLICATIONS:  None. IMPLANT:  InterStim battery and lead. CLINICAL NOTE:  The patient is a 81-year-old gentleman with a history of urge incontinence, who has been helped tremendously by his InterStim. However, he has had worsening success with his InterStim and requiring higher and higher amplitudes. He notes that eventually the InterStim generator  and he has not been miserable. He presents for revision. OPERATIVE REPORT:  Preoperatively, risks and benefits of surgery were described to the patient. The risks include but are not limited to bleeding, infection, injury, failure and possible need for future procedures. The patient understood the risks and signed the informed consent. Patient was taken to the operating room and placed on the OR table in a prone position. He was administered a MAC anesthetic. He was administered intravenous antibiotics.   He was shaved and prepped and draped in the usual sterile manner. After a timeout was done, an incision was made over the old InterStim device, in the upper left outer buttock area. This was done after infiltrating the skin with lidocaine and Marcaine mixture. The incision was taken down sharply, all the way to the device. The little pseudocapsule around the device was then sharply opened and the InterStim generator was delivered through the incision. The lead was carefully removed from the generator, after being unscrewed and the generator was passed off. The InterStim lead was then cleaned, and each contacts point was tested; at contact point 0, 1, 2, and 3 and there was noted to be almost no bellow at any of the contacts and no toe, except for just withdrawing the foot, likely from discomfort. An image was taken with C-arm, AP, and then crosstable lateral.  It was determined that the device had been removed, pulled back through the foramen considerably and was likely no longer making good contact with the nerve. At this point, I decided to put a new lead in on the right side. With AP and lateral views, after determining exactly where the third sacral foramen was, a foramen needle was then inserted into the third sacral foramen, as confirmed by fluoroscopy. The foramen needle was tested and there was excellent toe movement and without plantar flexion, and there was excellent bellow movement. Once it was determined to be at an excellent angle with minimal stimulation required for the toe and bellow, a needle stylet was removed and a directional guide was placed through the foramen needle. The depth was confirmed under fluoroscopy. The foramen needle was then removed. An incision was made laterally from the directional guide through the skin and then a dilator introducer sheath was placed over the directional guide and directed into the foramen, until the opaque marker of the dilator was seen on the anterior rim of the sacrum. The dilator obturator was unlocked and removed, along with the directional guide. The tined lead was then placed through the introducer sheath, into the first white line. Position was confirmed by fluoroscopy. The lead was then introduced further into the sheath, until 3 electrodes were visible below the sacrum. One electrode was within the sacrum. Each electrode was tested for visualization of andrew and plantar flexion of the great toe. After each contact point on the lead was checked and there was excellent bellow and excellent toe movement at position 0, 1, 2 and 3. After satisfactory positioning was confirmed, the introducer sheath was retracted under continuous fluoroscopy, deploying the tined leads into the presacral tissue. The leads were then retested to confirm motor response and there was excellent toe and bellow movement. The pocket that had been previously formed for the prior generator was then again irrigated out copiously with antibiotic solution. A tunneling tool and tube were placed from the lead subcutaneously to the pocket site. The tunneling tool was removed and the lead was fed through the tube and pulled out of the pocket site. The wound was again copiously irrigated with antibiotic solution. The tined lead was dried and was inserted into the InterStim 2 generator. Once the blue tip of the lead was visible in the window, it was inserted far enough. A hex wrench was then used to tighten this set screw down and it was tugged and felt to be secure. The InterStim 2 generator was placed into the subcutaneous pocket and it laid very nicely. I placed a tissue plane closer to the skin then the previous pocket had been. There was very little movement with the InterStim in this position. After the InterStim 2 was placed in the subcutaneous pocket and laid nicely, a blue towel was placed over the incision. Programming head was placed over the implanted neurostimulator. Impedances were verified to be greater than 50 ohms and less than 4000 ohms. After implantation of the InterStim 2 generator was completed, the complex programming of the neurostimulator was performed. The wound was closed in 2 layers; a 3-0 Vicryl Amy's layer in interrupted fashion was used. A running 4-0 Vicryl subcuticular stitch was used for the skin. All layers were irrigated in between. There was excellent hemostasis. The insertion site of the sacrum was then also closed with a single 4-0 Vicryl suture, in a subcuticular fashion. The wounds were then washed and dried and Steri-Strips and benzoin were applied. A sterile Tegaderm dressing was then applied. All needle, sponge, and instrument counts were correct x2. The patient was then placed in supine position on the stretcher and transferred to recovery room awake, alert, and in stable condition.       Reji Simmons MD       [address] [place]  D: 01/22/2019 09:21     T: 01/22/2019 10:17  JOB #: [id number]

## 2019-01-22 NOTE — PERIOP NOTES
TRANSFER - OUT REPORT: 
 
Verbal report given to Juanito Isabel(name) on Hansel Jovel  being transferred to phase 2(unit) for routine post - op Report consisted of patients Situation, Background, Assessment and  
Recommendations(SBAR). Information from the following report(s) SBAR, Kardex, OR Summary, Procedure Summary, Intake/Output and MAR was reviewed with the receiving nurse. Lines:  
Peripheral IV 01/22/19 Left Hand (Active) Site Assessment Clean, dry, & intact 1/22/2019  9:20 AM  
Phlebitis Assessment 0 1/22/2019  9:20 AM  
Infiltration Assessment 0 1/22/2019  9:20 AM  
Dressing Status Clean, dry, & intact 1/22/2019  9:20 AM  
Dressing Type Transparent;Tape 1/22/2019  9:20 AM  
Hub Color/Line Status Infusing 1/22/2019  9:20 AM  
Action Taken Armboard 1/22/2019  8:01 AM  
  
 
Opportunity for questions and clarification was provided. Patient transported with: 
 Registered Nurse Tech

## 2023-12-29 ENCOUNTER — HOME HEALTH ADMISSION (OUTPATIENT)
Age: 68
End: 2023-12-29

## 2024-01-11 ENCOUNTER — HOME HEALTH ADMISSION (OUTPATIENT)
Age: 69
End: 2024-01-11
Payer: COMMERCIAL

## 2024-01-12 ENCOUNTER — HOME CARE VISIT (OUTPATIENT)
Age: 69
End: 2024-01-12
Payer: COMMERCIAL

## 2024-01-12 VITALS
TEMPERATURE: 97.8 F | OXYGEN SATURATION: 96 % | DIASTOLIC BLOOD PRESSURE: 70 MMHG | HEART RATE: 76 BPM | RESPIRATION RATE: 18 BRPM | SYSTOLIC BLOOD PRESSURE: 130 MMHG

## 2024-01-12 PROCEDURE — G0151 HHCP-SERV OF PT,EA 15 MIN: HCPCS

## 2024-01-12 PROCEDURE — 0221000100 HH NO PAY CLAIM PROCEDURE

## 2024-01-12 ASSESSMENT — ENCOUNTER SYMPTOMS: DYSPNEA ACTIVITY LEVEL: AFTER AMBULATING MORE THAN 20 FT

## 2024-01-12 NOTE — HOME HEALTH
risk and burden of care.Patient would benefit from home health physical therapy to improve balance, strength, and endurance which would decrease fall risk and allow patient to return to prior level of function once all functional goals or full rehab potential is met.patient educated with HEP including seated hip flex, knee extension, hip abduction, hip adduction, ankle PF and DF and ball squeeze x 20 reps x 3 sets daily to improve MMT on BLE to facilitate with improved bed mobility, transfers and gait with AD. patient also educated with deep breathing exercises to be done daily x 10 reps x 3 sets to prevent SOB during activity. Patient educated with fall prevention technique by decluttering space, proper use of AD and footwear
Discharged

## 2024-01-15 ENCOUNTER — HOME CARE VISIT (OUTPATIENT)
Age: 69
End: 2024-01-15
Payer: COMMERCIAL

## 2024-01-15 PROCEDURE — G0157 HHC PT ASSISTANT EA 15: HCPCS

## 2024-01-16 VITALS — OXYGEN SATURATION: 96 % | HEART RATE: 71 BPM | TEMPERATURE: 97.4 F

## 2024-01-16 NOTE — HOME HEALTH
SUBJECTIVE: I can feel the difference after the exercises  CAREGIVER INVOLVEMENT/ASSISTANCE NEEDED FOR: Lives with wife and daughter lives close by they can assist with IADLs and driving  .  OBJECTIVE:  See interventions.  PATIENT EDUCATION PROVIDED THIS VISIT: Continue with seated HEP and walk every 3 hours inside your home  PATIENT RESPONSE TO EDUCATION PROVIDED: verbalized understanding  PATIENT RESPONSE TO TREATMENT: No increase in pain with gait training or B LE exercises.    CONTINUED NEED FOR THE FOLLOWING SKILLS: HH PT is medically necessary to address pain, decreased ROM, decreased strength, increased swelling, impaired bed mobility, decreased independence with functional transfers, impaired gait, impaired stair negotiation, and impaired balance in order to improve functional independence, quality of life, return to PLOF, reduce the risk for falls, and reduce pain.  .  ASSESSMENT OF PROGRESS TOWARD GOALS: Patient is progressing towards goals by improving ambulation on even surfaces. Slight B LE fatigue noted during gait training with seated rest breaks with completion.     PLAN FOR NEXT VISIT: Stair training, increase gait distance.  THE FOLLOWING DISCHARGE PLANNING WAS DISCUSSED WITH THE PATIENT/CAREGIVER: At this time needs continued skilled home health physical therapy to address deficits, reduce fall risk and to obtain goals previously established per plan of care. 1x1 2x3 visit are left.

## 2024-01-19 ENCOUNTER — HOME CARE VISIT (OUTPATIENT)
Age: 69
End: 2024-01-19
Payer: COMMERCIAL

## 2024-01-19 PROCEDURE — G0157 HHC PT ASSISTANT EA 15: HCPCS

## 2024-01-21 VITALS
SYSTOLIC BLOOD PRESSURE: 98 MMHG | DIASTOLIC BLOOD PRESSURE: 60 MMHG | RESPIRATION RATE: 15 BRPM | TEMPERATURE: 76.6 F | HEART RATE: 75 BPM | OXYGEN SATURATION: 97 %

## 2024-01-23 ENCOUNTER — HOME CARE VISIT (OUTPATIENT)
Age: 69
End: 2024-01-23
Payer: COMMERCIAL

## 2024-01-23 PROCEDURE — G0157 HHC PT ASSISTANT EA 15: HCPCS

## 2024-01-23 PROCEDURE — G0151 HHCP-SERV OF PT,EA 15 MIN: HCPCS

## 2024-01-24 VITALS
DIASTOLIC BLOOD PRESSURE: 69 MMHG | SYSTOLIC BLOOD PRESSURE: 109 MMHG | OXYGEN SATURATION: 98 % | RESPIRATION RATE: 16 BRPM | HEART RATE: 75 BPM | TEMPERATURE: 98.6 F

## 2024-01-24 ASSESSMENT — ENCOUNTER SYMPTOMS: PAIN LOCATION - PAIN QUALITY: ACHE

## 2024-01-24 NOTE — HOME HEALTH
POST FALL:   Date and Time of Fall: 1/19/24 3 am  SOC/ARELY Date: 1/12/24  Fall observed by HH Staff? no  Describe Event and Document any re-training or treatment plan modifications indicated:  patient fell when getting up to self cath in the night and had several more falls due to dizziness and orthostatic hypotension  Response to re-training or treatment plan modifications: patient's MD notified about orthostatic s/s and fall. Wife changed BP medication. Patient instructed in use of an AD at all times.  Assisted Devices used by patient prior to fall: no  Was equipment in use at time of fall? no  Injury no, If yes, describe:  no  Emergent Care Received:No   Was patient identified as High Risk for falls? yes  List Tests Performed, Scores of Tests, and Patient Risk Factors: FTSTS 29 seconds with BUE useMelba 12/28  MD Notified (name and time): Dr. Vazquez notifed by Flory Morgan 1/19/24. MD called today to report that wife changed medications and notification of fall and orthostatic hypotension issue.    S: Patient stated he has been doing better and not dizzy because his wife cut his carvedilol in half, instead of taking twice daily he is taking only once.   O: PAIN: see pain tab   WOUND:no open wounds noted or reported.   ROM: no impairments noted   STRENGTH:Patient demonstrates decreased muscle strength as follows:  R hip flex 3/5  R hip abd 3/5  R hip add 3/5  R knee flex 4/5  R knee ext 3/5  R ankle DF 4/5  L hip flex 3/5  L hip abd 3/5  L hip add 3/5  L knee flex 3/5  L knee ext 3/5  L ankle DF 4/5  FTSTS 29 seconds with BUE to assist   EQUIPMENT: patient not using his walker anymore stating he is trying to go without it. I encouraged him to at least purchase a cane due to recent fall and high fall risk.  TRANSFERS: patient requires supervision from recliner and uses BUE to push up and significant support of back of legs on recliner for support and balance upon standing, has a wide base of support and

## 2024-01-26 ENCOUNTER — HOME CARE VISIT (OUTPATIENT)
Age: 69
End: 2024-01-26
Payer: COMMERCIAL

## 2024-01-26 VITALS
SYSTOLIC BLOOD PRESSURE: 154 MMHG | RESPIRATION RATE: 15 BRPM | DIASTOLIC BLOOD PRESSURE: 88 MMHG | OXYGEN SATURATION: 99 % | TEMPERATURE: 97.6 F | HEART RATE: 68 BPM

## 2024-01-26 PROCEDURE — G0157 HHC PT ASSISTANT EA 15: HCPCS

## 2024-01-26 ASSESSMENT — ENCOUNTER SYMPTOMS: PAIN LOCATION - PAIN QUALITY: ACHE

## 2024-01-29 ENCOUNTER — HOME CARE VISIT (OUTPATIENT)
Age: 69
End: 2024-01-29
Payer: COMMERCIAL

## 2024-01-29 PROCEDURE — G0157 HHC PT ASSISTANT EA 15: HCPCS

## 2024-01-30 VITALS
HEART RATE: 73 BPM | OXYGEN SATURATION: 97 % | RESPIRATION RATE: 14 BRPM | TEMPERATURE: 97.5 F | DIASTOLIC BLOOD PRESSURE: 94 MMHG | SYSTOLIC BLOOD PRESSURE: 176 MMHG

## 2024-01-30 ASSESSMENT — ENCOUNTER SYMPTOMS: PAIN LOCATION - PAIN QUALITY: DULL

## 2024-01-30 NOTE — HOME HEALTH
SUBJECTIVE: I feel like I'm getting stronger, I have more confidence in myself.  CAREGIVER INVOLVEMENT/ASSISTANCE NEEDED FOR: Lives with wife who can assist with IADLs and driving  .  OBJECTIVE:  See interventions.  PATIENT EDUCATION PROVIDED THIS VISIT: Continue with seated HEP and walk every 3 hours inside and out of home  PATIENT RESPONSE TO EDUCATION PROVIDED: verbalized understanding  PATIENT RESPONSE TO TREATMENT: No  pain with gait or stair training. Fatigue with completion of full flight of stairs.    CONTINUED NEED FOR THE FOLLOWING SKILLS: HH PT is medically necessary to address decreased strength L >R L LE, decreased independence with functional transfers, impaired gait, impaired stair negotiation, and impaired balance in order to improve functional independence, quality of life, return to PLOF, and reduce the risk for falls.  .  ASSESSMENT OF PROGRESS TOWARD GOALS: Patient is progressing towards goals by improving ambulation stability on uneven surfaces and stair navigation to second floor. Slight fatigue noted with stair training, seated rest break with completion.     PLAN FOR NEXT VISIT: Introduce standing B LE HEP  THE FOLLOWING DISCHARGE PLANNING WAS DISCUSSED WITH THE PATIENT/CAREGIVER:  At this time needs continued skilled home health physical therapy to address deficits, reduce fall risk and to obtain goals previously established per plan of care. 1x1 2x1 visit are left.

## 2024-02-01 ENCOUNTER — HOME CARE VISIT (OUTPATIENT)
Age: 69
End: 2024-02-01
Payer: COMMERCIAL

## 2024-02-01 NOTE — CASE COMMUNICATION
Called pt to el  OT initial evaluation. Pt declines HHOT services at this time.     Thank you,  Efrain Sam, OT License Applicant

## 2024-02-02 ENCOUNTER — HOME CARE VISIT (OUTPATIENT)
Age: 69
End: 2024-02-02
Payer: COMMERCIAL

## 2024-02-02 VITALS
HEART RATE: 71 BPM | SYSTOLIC BLOOD PRESSURE: 166 MMHG | DIASTOLIC BLOOD PRESSURE: 99 MMHG | RESPIRATION RATE: 15 BRPM | TEMPERATURE: 97.4 F | OXYGEN SATURATION: 99 %

## 2024-02-02 PROCEDURE — G0157 HHC PT ASSISTANT EA 15: HCPCS

## 2024-02-02 ASSESSMENT — ENCOUNTER SYMPTOMS: PAIN LOCATION - PAIN QUALITY: ACHE

## 2024-02-02 NOTE — HOME HEALTH
SUBJECTIVE: My PCP wants me to see a cardiologist  CAREGIVER INVOLVEMENT/ASSISTANCE NEEDED FOR: Lives with wife who can assist with IADLs and driving  .  OBJECTIVE:  See interventions.  PATIENT EDUCATION PROVIDED THIS VISIT: Continue with seated and standing HEP and walk every 3 hours inside and out of home  PATIENT RESPONSE TO EDUCATION PROVIDED: verbalized understanding  PATIENT RESPONSE TO TREATMENT: No increase in pain with gait training or standing B LE exercises    CONTINUED NEED FOR THE FOLLOWING SKILLS: HH PT is medically necessary to address decreased strength, increased swelling (L>R LE), decreased independence with functional transfers, impaired gait, impaired stair negotiation, and impaired balance in order to improve functional independence, quality of life, return to PLOF, and to reduce the risk for falls.  .  ASSESSMENT OF PROGRESS TOWARD GOALS: Patient is progressing towards goals by improving ambulation tolerance with increase of distance and decrease in device needed on even and uneven surfaces and stair navigation. slight fatigue noted during standing exercises with seated rest break with completion.     PLAN FOR NEXT VISIT: Tinetti and TUG for PT discharge  THE FOLLOWING DISCHARGE PLANNING WAS DISCUSSED WITH THE PATIENT/CAREGIVER:  At this time needs continued skilled home health physical therapy to address deficits, reduce fall risk and to obtain goals previously established per plan of care. 2x1 visit are left.

## 2024-02-06 ENCOUNTER — HOME CARE VISIT (OUTPATIENT)
Age: 69
End: 2024-02-06
Payer: COMMERCIAL

## 2024-02-06 VITALS
DIASTOLIC BLOOD PRESSURE: 84 MMHG | OXYGEN SATURATION: 97 % | HEART RATE: 72 BPM | SYSTOLIC BLOOD PRESSURE: 152 MMHG | RESPIRATION RATE: 16 BRPM

## 2024-02-06 PROCEDURE — G0151 HHCP-SERV OF PT,EA 15 MIN: HCPCS

## 2024-02-06 ASSESSMENT — ENCOUNTER SYMPTOMS: PAIN LOCATION - PAIN QUALITY: ACHE

## 2024-02-07 NOTE — HOME HEALTH
SUBJECTIVE: Patient stated he was aware that he had two more PT visits and is in agreement with plan.  CAREGIVER INVOLVEMENT/ASSISTANCE NEEDED FOR: wife assisting with transporation  OBJECTIVE:  See interventions.  PATIENT EDUCATION PROVIDED THIS VISIT: patient instructed in diabetic foot care  PATIENT RESPONSE TO EDUCATION PROVIDED: patient expressed understanding of importance of checking feet and states his PCP checks his feet and sensation during his apts as well.  PATIENT RESPONSE TO TREATMENT: patient denied increased LLE pain with standing HEP and did not require any rest breaks during visit.  ASSESSMENT OF PROGRESS TOWARD GOALS: patient making good progress towards goals with increased endurance noted and patient stated he has been doing much more walking and resumed getting the mail and other household chores.  PLAN FOR NEXT VISIT: DC to a HEP next visit  THE FOLLOWING DISCHARGE PLANNING WAS DISCUSSED WITH THE PATIENT/CAREGIVER: 1W1, patient in agreement.

## 2024-02-09 ENCOUNTER — HOME CARE VISIT (OUTPATIENT)
Age: 69
End: 2024-02-09
Payer: COMMERCIAL

## 2024-02-09 PROCEDURE — G0151 HHCP-SERV OF PT,EA 15 MIN: HCPCS

## 2024-02-10 VITALS
OXYGEN SATURATION: 99 % | TEMPERATURE: 98 F | DIASTOLIC BLOOD PRESSURE: 88 MMHG | RESPIRATION RATE: 16 BRPM | SYSTOLIC BLOOD PRESSURE: 150 MMHG | HEART RATE: 64 BPM

## 2024-02-10 ASSESSMENT — ENCOUNTER SYMPTOMS: PAIN LOCATION - PAIN QUALITY: ACHE

## 2024-02-10 NOTE — CASE COMMUNICATION
Vincent Smith received skilled PT  s/p hospitalization with a dx of acute renal failure. This patient has currently met maximum potential with in home PT and has been discharged to a [facility] at this time. The following goals have been met: Patient is ambulating > 200 ft without an AD and is able to ascend and descend stairs MI.  Tinetti improved to 21/28 and TUG 27 seconds. FTSTS not performed today due to elevated BP. Patient was instruct ed to monitor BP and call MD if it is trending higher as he still has s/s of orthostatic hypotension. His wife is an RN and states she is monitoring it as well. Patient verbalized understanding of all discharge instructions and is in agreement with discharge this visit.     /102 seated and standing 150/88 patient denies HA or blurred vision this visit and will take another dose of his BP this evening. He was instructed to call MD  if he was symtomatic with his high blood pressure. Pt/cg reported understanding.

## 2024-02-10 NOTE — HOME HEALTH
S: Patient stated he did fine after last PT session.    O: PAIN: see pain tab   WOUND:no open wounds noted or reported.   ROM: no impairments noted   STRENGTH:Patient demonstrates decreased muscle strength as follows:  R hip flex 5/5  R hip abd 5/5  R hip add 5/5  R knee flex 5/5  R knee ext 5/5  R ankle DF 5/5  L hip flex 4/5  L hip abd 4/5  L hip add 4/5  L knee flex 4/5  L knee ext 4/5  L ankle DF 4/5  FTSTS not performed this visit   BED MOBILITY: independent   EQUIPMENT: none  TRANSFERS: MI  GAIT: independent with no AD > 200 ft on even and uneven surfaces.  STEPS: MI with railing   BALANCE: Tinetti 21/28 and TUG 27 seconds - there have been no more falls since last reassessment   A:ASSESSMENT AND PROGRESS TOWARD GOALS:Vincent Smith received skilled PT  s/p hospitalization with a dx of acute renal failure. This patient has currently met maximum potential with in home PT and has been discharged to a [facility] at this time. The following goals have been met: Patient is ambulating > 200 ft without an AD and is able to ascend and descend stairs MI.  Tinetti improved to 21/28 and TUG 27 seconds. FTSTS not performed today due to elevated BP. Patient was instructed to monitor BP and call MD if it is trending higher as he still has s/s of orthostatic hypotension. His wife is an RN and states she is monitoring it as well. Patient verbalized understanding of all discharge instructions and is in agreement with discharge this visit.   /102 seated and standing 150/88 patient denies HA or blurred vision this visit and will take another dose of his BP this evening. He was instructed to call MD if he was symtomatic with his high blood pressure. Pt/cg reported understanding.     P: DC    Discharge medication list reconciled with patient and caregiver. Questions regarding medications answered and patient/caregiver advised to refer to MD for any medication questions after discharge.  2. Patient to continue use of the following

## 2024-06-11 NOTE — PRE-PROCEDURE INSTRUCTIONS
Spoke to patient to schedule appointment.  Instructed to arrive at 0730 for 0930 appointment.  Instructed to be NPO at midnight.   Pt denies anticoagulation use.   Medications and allergies reviewed.  Opportunity for questions provided.

## 2024-06-20 ENCOUNTER — HOSPITAL ENCOUNTER (OUTPATIENT)
Facility: HOSPITAL | Age: 69
Discharge: HOME | End: 2024-06-20
Payer: MEDICARE

## 2024-06-20 ENCOUNTER — TRANSCRIBE ORDERS (OUTPATIENT)
Facility: HOSPITAL | Age: 69
End: 2024-06-20

## 2024-06-20 DIAGNOSIS — R27.0 ATAXIA, UNSPECIFIED: ICD-10-CM

## 2024-06-20 DIAGNOSIS — I10 PRIMARY HYPERTENSION: ICD-10-CM

## 2024-06-20 DIAGNOSIS — I10 PRIMARY HYPERTENSION: Primary | ICD-10-CM

## 2024-06-20 DIAGNOSIS — R42 DIZZINESS AND GIDDINESS: ICD-10-CM

## 2024-06-20 LAB
25(OH)D3 SERPL-MCNC: 26.3 NG/ML (ref 30–100)
CALCIUM SERPL-MCNC: 9.1 MG/DL (ref 8.5–10.1)
CREAT UR-MCNC: 35 MG/DL (ref 30–125)
CREAT UR-MCNC: 35 MG/DL (ref 30–125)
MICROALBUMIN UR-MCNC: 57.8 MG/DL (ref 0–3)
MICROALBUMIN/CREAT UR-RTO: 1651 MG/G (ref 0–30)
PROT UR-MCNC: 89 MG/DL
PROT/CREAT UR-RTO: 2.5
PTH-INTACT SERPL-MCNC: 154.4 PG/ML (ref 18.4–88)
URATE SERPL-MCNC: 5.1 MG/DL (ref 2.6–7.2)

## 2024-06-20 PROCEDURE — 83521 IG LIGHT CHAINS FREE EACH: CPT

## 2024-06-20 PROCEDURE — 82306 VITAMIN D 25 HYDROXY: CPT

## 2024-06-20 PROCEDURE — 82570 ASSAY OF URINE CREATININE: CPT

## 2024-06-20 PROCEDURE — 82784 ASSAY IGA/IGD/IGG/IGM EACH: CPT

## 2024-06-20 PROCEDURE — 82043 UR ALBUMIN QUANTITATIVE: CPT

## 2024-06-20 PROCEDURE — 84155 ASSAY OF PROTEIN SERUM: CPT

## 2024-06-20 PROCEDURE — 86335 IMMUNFIX E-PHORSIS/URINE/CSF: CPT

## 2024-06-20 PROCEDURE — 83970 ASSAY OF PARATHORMONE: CPT

## 2024-06-20 PROCEDURE — 84156 ASSAY OF PROTEIN URINE: CPT

## 2024-06-20 PROCEDURE — 84166 PROTEIN E-PHORESIS/URINE/CSF: CPT

## 2024-06-20 PROCEDURE — 86334 IMMUNOFIX E-PHORESIS SERUM: CPT

## 2024-06-20 PROCEDURE — 84165 PROTEIN E-PHORESIS SERUM: CPT

## 2024-06-20 PROCEDURE — 84550 ASSAY OF BLOOD/URIC ACID: CPT

## 2024-06-20 PROCEDURE — 36415 COLL VENOUS BLD VENIPUNCTURE: CPT

## 2024-06-21 LAB
KAPPA LC FREE SER-MCNC: 58.4 MG/L (ref 3.3–19.4)
KAPPA LC FREE/LAMBDA FREE SER: 1.54 (ref 0.26–1.65)
LAMBDA LC FREE SERPL-MCNC: 37.8 MG/L (ref 5.7–26.3)

## 2024-06-24 ENCOUNTER — HOSPITAL ENCOUNTER (OUTPATIENT)
Facility: HOSPITAL | Age: 69
Discharge: HOME | End: 2024-06-24
Attending: STUDENT IN AN ORGANIZED HEALTH CARE EDUCATION/TRAINING PROGRAM | Admitting: STUDENT IN AN ORGANIZED HEALTH CARE EDUCATION/TRAINING PROGRAM
Payer: MEDICARE

## 2024-06-24 VITALS
HEIGHT: 72 IN | WEIGHT: 252.4 LBS | BODY MASS INDEX: 34.19 KG/M2 | TEMPERATURE: 97.6 F | OXYGEN SATURATION: 100 % | RESPIRATION RATE: 16 BRPM | DIASTOLIC BLOOD PRESSURE: 74 MMHG | HEART RATE: 60 BPM | SYSTOLIC BLOOD PRESSURE: 152 MMHG

## 2024-06-24 DIAGNOSIS — N04.9 NEPHROTIC SYNDROME: ICD-10-CM

## 2024-06-24 LAB
ALBUMIN 24H MFR UR ELPH: 63.5 %
ALPHA1 GLOB 24H MFR UR ELPH: 5.7 %
ALPHA2 GLOB 24H MFR UR ELPH: 6.1 %
ANION GAP SERPL CALC-SCNC: 6 MMOL/L (ref 3–18)
B-GLOBULIN MFR UR ELPH: 12.2 %
BUN SERPL-MCNC: 49 MG/DL (ref 7–18)
BUN/CREAT SERPL: 20 (ref 12–20)
CALCIUM SERPL-MCNC: 8.7 MG/DL (ref 8.5–10.1)
CHLORIDE SERPL-SCNC: 110 MMOL/L (ref 100–111)
CO2 SERPL-SCNC: 22 MMOL/L (ref 21–32)
CREAT SERPL-MCNC: 2.49 MG/DL (ref 0.6–1.3)
ERYTHROCYTE [DISTWIDTH] IN BLOOD BY AUTOMATED COUNT: 13.6 % (ref 11.6–14.5)
GAMMA GLOB 24H MFR UR ELPH: 12.6 %
GLUCOSE BLD STRIP.AUTO-MCNC: 135 MG/DL (ref 70–110)
GLUCOSE SERPL-MCNC: 129 MG/DL (ref 74–99)
HCT VFR BLD AUTO: 32.4 % (ref 36–48)
HGB BLD-MCNC: 10.4 G/DL (ref 13–16)
INR PPP: 1.1 (ref 0.9–1.1)
INTERPRETATION UR IFE-IMP: NORMAL
Lab: NORMAL
M PROTEIN 24H MFR UR ELPH: NORMAL %
MCH RBC QN AUTO: 28.9 PG (ref 24–34)
MCHC RBC AUTO-ENTMCNC: 32.1 G/DL (ref 31–37)
MCV RBC AUTO: 90 FL (ref 78–100)
NRBC # BLD: 0 K/UL (ref 0–0.01)
NRBC BLD-RTO: 0 PER 100 WBC
PLATELET # BLD AUTO: 258 K/UL (ref 135–420)
PMV BLD AUTO: 8.9 FL (ref 9.2–11.8)
POTASSIUM SERPL-SCNC: 4.5 MMOL/L (ref 3.5–5.5)
PROT UR-MCNC: 87 MG/DL
PROTHROMBIN TIME: 13.9 SEC (ref 11.9–14.9)
RBC # BLD AUTO: 3.6 M/UL (ref 4.35–5.65)
SODIUM SERPL-SCNC: 138 MMOL/L (ref 136–145)
WBC # BLD AUTO: 8.2 K/UL (ref 4.6–13.2)

## 2024-06-24 PROCEDURE — 82962 GLUCOSE BLOOD TEST: CPT

## 2024-06-24 PROCEDURE — 85027 COMPLETE CBC AUTOMATED: CPT

## 2024-06-24 PROCEDURE — 80048 BASIC METABOLIC PNL TOTAL CA: CPT

## 2024-06-24 PROCEDURE — 85610 PROTHROMBIN TIME: CPT

## 2024-06-24 PROCEDURE — 2580000003 HC RX 258: Performed by: STUDENT IN AN ORGANIZED HEALTH CARE EDUCATION/TRAINING PROGRAM

## 2024-06-24 PROCEDURE — 88305 TISSUE EXAM BY PATHOLOGIST: CPT

## 2024-06-24 PROCEDURE — 6360000002 HC RX W HCPCS

## 2024-06-24 PROCEDURE — 77012 CT SCAN FOR NEEDLE BIOPSY: CPT

## 2024-06-24 PROCEDURE — 2709999900 CT BIOPSY RENAL

## 2024-06-24 PROCEDURE — 99152 MOD SED SAME PHYS/QHP 5/>YRS: CPT

## 2024-06-24 RX ORDER — LABETALOL HYDROCHLORIDE 5 MG/ML
INJECTION, SOLUTION INTRAVENOUS PRN
Status: COMPLETED | OUTPATIENT
Start: 2024-06-24 | End: 2024-06-24

## 2024-06-24 RX ORDER — MIDAZOLAM HYDROCHLORIDE 2 MG/2ML
INJECTION, SOLUTION INTRAMUSCULAR; INTRAVENOUS PRN
Status: COMPLETED | OUTPATIENT
Start: 2024-06-24 | End: 2024-06-24

## 2024-06-24 RX ORDER — FENTANYL CITRATE 50 UG/ML
INJECTION, SOLUTION INTRAMUSCULAR; INTRAVENOUS PRN
Status: COMPLETED | OUTPATIENT
Start: 2024-06-24 | End: 2024-06-24

## 2024-06-24 RX ORDER — M-VIT,TX,IRON,MINS/CALC/FOLIC 27MG-0.4MG
1 TABLET ORAL DAILY
COMMUNITY

## 2024-06-24 RX ORDER — FUROSEMIDE 10 MG/ML
10 INJECTION INTRAMUSCULAR; INTRAVENOUS ONCE
COMMUNITY

## 2024-06-24 RX ORDER — ZINC SULFATE 50(220)MG
50 CAPSULE ORAL DAILY
COMMUNITY

## 2024-06-24 RX ORDER — ACETAMINOPHEN 160 MG
TABLET,DISINTEGRATING ORAL
COMMUNITY

## 2024-06-24 RX ORDER — AMLODIPINE BESYLATE 5 MG/1
10 TABLET ORAL DAILY
COMMUNITY

## 2024-06-24 RX ORDER — HYDROCHLOROTHIAZIDE 25 MG/1
25 TABLET ORAL 3 TIMES DAILY
COMMUNITY

## 2024-06-24 RX ORDER — SODIUM CHLORIDE 9 MG/ML
INJECTION, SOLUTION INTRAVENOUS CONTINUOUS
Status: DISCONTINUED | OUTPATIENT
Start: 2024-06-24 | End: 2024-06-24 | Stop reason: HOSPADM

## 2024-06-24 RX ORDER — HYDRALAZINE HYDROCHLORIDE 20 MG/ML
INJECTION INTRAMUSCULAR; INTRAVENOUS PRN
Status: COMPLETED | OUTPATIENT
Start: 2024-06-24 | End: 2024-06-24

## 2024-06-24 RX ADMIN — HYDRALAZINE HYDROCHLORIDE 20 MG: 20 INJECTION, SOLUTION INTRAMUSCULAR; INTRAVENOUS at 10:10

## 2024-06-24 RX ADMIN — FENTANYL CITRATE 50 MCG: 50 INJECTION, SOLUTION INTRAMUSCULAR; INTRAVENOUS at 10:06

## 2024-06-24 RX ADMIN — MIDAZOLAM HYDROCHLORIDE 1 MG: 1 INJECTION, SOLUTION INTRAMUSCULAR; INTRAVENOUS at 09:52

## 2024-06-24 RX ADMIN — SODIUM CHLORIDE 20 ML/HR: 9 INJECTION, SOLUTION INTRAVENOUS at 08:32

## 2024-06-24 RX ADMIN — FENTANYL CITRATE 50 MCG: 50 INJECTION, SOLUTION INTRAMUSCULAR; INTRAVENOUS at 09:52

## 2024-06-24 RX ADMIN — MIDAZOLAM HYDROCHLORIDE 1 MG: 1 INJECTION, SOLUTION INTRAMUSCULAR; INTRAVENOUS at 10:06

## 2024-06-24 RX ADMIN — LABETALOL HYDROCHLORIDE 10 MG: 5 INJECTION, SOLUTION INTRAVENOUS at 10:20

## 2024-06-24 ASSESSMENT — PAIN - FUNCTIONAL ASSESSMENT: PAIN_FUNCTIONAL_ASSESSMENT: NONE - DENIES PAIN

## 2024-06-24 NOTE — OR NURSING
Pt was educated to procedure and sedation. Pt NPO. Pt does not take anticoagulation. Pt confirms no problems with sedation in the past.

## 2024-06-24 NOTE — DISCHARGE INSTRUCTIONS
Needle Biopsy of the Kidney: What to Expect at Home  Your Recovery     A kidney biopsy is a test to take a sample (biopsy) of kidney. The doctor puts a long needle through your back (flank) into the kidney. Another doctor will look at the kidney tissue with a microscope to check for problems.  After the test, you will be told to lie down on your back for several hours. After this, you should avoid strenuous activity for the next 2 to 3 days. If you stopped taking aspirin or some other blood thinner before the test, your doctor will tell you when to start taking it again.  It's normal to feel some soreness in the area of the biopsy for 2 to 3 days. You may have a small amount of bleeding on the bandage after the test. You may notice some blood in your urine after the test. This should go away in 1 or 2 days. If it doesn't, call your doctor.  This care sheet gives you a general idea about how long it will take for you to recover. But each person recovers at a different pace. Follow the steps below to feel better as quickly as possible.  How can you care for yourself at home?  Activity    Avoid lifting anything that would make you strain. This may include heavy grocery bags and milk containers, a heavy briefcase or backpack, cat litter or dog food bags, a vacuum , or a child.     Avoid strenuous activities, such as bicycle riding, jogging, weight lifting, or aerobic exercise, until your doctor says it is okay.     Try to walk each day. Start by walking a little more than you did the day before. Bit by bit, increase the amount you walk. Walking boosts blood flow and helps prevent pneumonia and constipation.     Rest when you feel tired. Getting enough sleep will help you recover.     Ask your doctor when it is okay for you to have sex.   Diet    You can eat your normal diet. If your stomach is upset, try bland, low-fat foods like plain rice, broiled chicken, toast, and yogurt.     Drink plenty of fluids to

## 2024-06-24 NOTE — PRE SEDATION
Date/Time    INR 1.1 06/24/2024 08:39 AM         PHYSICAL EXAM   BP (!) 163/76   Pulse 58   Temp 97.8 °F (36.6 °C) (Oral)   Resp 16   Ht 1.829 m (6')   Wt 114.5 kg (252 lb 6.4 oz)   SpO2 100%   BMI 34.23 kg/m²    General:  NAD  Heart:  RRR  Lungs:  NWOB  Neurological:  [id number]    Pre-Sedation Documentation and Exam:   Vital signs have been reviewed (see flow sheet for vitals).  I have reviewed the patient's history and review of systems.    ASSESSMENT / PLAN   Sedation/ Anesthesia Plan:   Local  intravenous sedation    Medications Planned:   1% lidocaine locally  midazolam (Versed) intravenously and fentanyl intravenously    Mallampati Airway Assessment:  Mallampati Class III - (soft palate & base of uvula are visible)    Prior History of Anesthesia Complications:   none    ASA Classification:  Class 3 - A patient with severe systemic disease that limits activity but is not incapacitating    Patient is an appropriate candidate for plan of sedation: yes    Code status:  Full      Evonne Carrera, APRN - CNP  [facility]

## 2024-06-24 NOTE — PROGRESS NOTES
1045 pt returned to care unit post kidney biopsy.  Pt alert and oriented x4, dressing to left flank area, clean dry and intact.  Pt lying flat with washcloth folded and placed under left flank area,  1230 resting with eyes closed, not distress noted   1330  pt awake,  lunch given,  humberto well  1500 pt resting, dressing to left flank area remians clean dry and intact,  denies any pain or distress,  discharge inst given and reviewed with pt,  iv discontinued, arm bands removed and shredded,   Pt discharged via wheelchair to car in care of wife

## 2024-06-24 NOTE — PROGRESS NOTES
TRANSFER - OUT REPORT:    Verbal report given to issa(name) velma Smith being transferred to care unit(unit) for routine post-op       Report consisted of patient's Situation, Background, Assessment and   Recommendations(SBAR).     Information from the following report(s) Nurse Handoff Report and Event Log was reviewed with the receiving nurse.    Opportunity for questions and clarification was provided.      Patient transported with:   Registered Nurse

## 2024-06-26 LAB
ALBUMIN SERPL ELPH-MCNC: 3.9 G/DL (ref 2.9–4.4)
ALBUMIN/GLOB SERPL: 1.2 (ref 0.7–1.7)
ALPHA1 GLOB SERPL ELPH-MCNC: 0.2 G/DL (ref 0–0.4)
ALPHA2 GLOB SERPL ELPH-MCNC: 0.9 G/DL (ref 0.4–1)
B-GLOBULIN SERPL ELPH-MCNC: 1.1 G/DL (ref 0.7–1.3)
GAMMA GLOB SERPL ELPH-MCNC: 1.2 G/DL (ref 0.4–1.8)
GLOBULIN SER-MCNC: 3.4 G/DL (ref 2.2–3.9)
IGA SERPL-MCNC: 278 MG/DL (ref 61–437)
IGG SERPL-MCNC: 1269 MG/DL (ref 603–1613)
IGM SERPL-MCNC: 43 MG/DL (ref 20–172)
INTERPRETATION SERPL IEP-IMP: NORMAL
M PROTEIN SERPL ELPH-MCNC: NORMAL G/DL
PROT SERPL-MCNC: 7.3 G/DL (ref 6–8.5)

## (undated) DEVICE — DBD-PACK,LAPAROTOMY,2 REINFORCED GOWNS: Brand: MEDLINE

## (undated) DEVICE — DEVON™ KNEE AND BODY STRAP 60" X 3" (1.5 M X 7.6 CM): Brand: DEVON

## (undated) DEVICE — SKIN MARKER,REGULAR TIP WITH RULER AND LABELS: Brand: DEVON

## (undated) DEVICE — 3M™ TEGADERM™ TRANSPARENT FILM DRESSING FRAME STYLE, 1627, 4 IN X 10 IN (10 CM X 25 CM), 20/CT 4CT/CASE: Brand: 3M™ TEGADERM™

## (undated) DEVICE — 3M™ TEGADERM™ TRANSPARENT FILM DRESSING FRAME STYLE, 1626W, 4 IN X 4-3/4 IN (10 CM X 12 CM), 50/CT 4CT/CASE: Brand: 3M™ TEGADERM™

## (undated) DEVICE — (D)STRIP SKN CLSR 0.5X4IN WHT --

## (undated) DEVICE — INTENDED FOR TISSUE SEPARATION, AND OTHER PROCEDURES THAT REQUIRE A SHARP SURGICAL BLADE TO PUNCTURE OR CUT.: Brand: BARD-PARKER ® CARBON RIB-BACK BLADES

## (undated) DEVICE — MASTISOL ADHESIVE LIQ 2/3ML

## (undated) DEVICE — SOL IRRIGATION INJ NACL 0.9% 500ML BTL

## (undated) DEVICE — DRAPE XR C ARM 41X74IN LF --

## (undated) DEVICE — PROGRAMMER NEUROSTIMULATOR PT FOR URIN CTRL INTERSTIM ICON

## (undated) DEVICE — LEAD KT INTRO INTERSTIM --

## (undated) DEVICE — 3M™ TEGADERM™ TRANSPARENT FILM DRESSING FRAME STYLE, 1626, 4 IN X 4-3/4 IN (10 CM X 12 CM), 50/CT 4CT/CASE: Brand: 3M™ TEGADERM™

## (undated) DEVICE — SUTURE VCRL SZ 4-0 L27IN ABSRB UD L19MM PS-2 3/8 CIR PRIM J426H

## (undated) DEVICE — TRAY PREP DRY W/ PREM GLV 2 APPL 6 SPNG 2 UNDPD 1 OVERWRAP

## (undated) DEVICE — SHEET,DRAPE,40X58,STERILE: Brand: MEDLINE

## (undated) DEVICE — 1010 S-DRAPE TOWEL DRAPE 10/BX: Brand: STERI-DRAPE™

## (undated) DEVICE — GOWN,AURORA,NONRNF,XL,30/CS: Brand: MEDLINE

## (undated) DEVICE — REM POLYHESIVE ADULT PATIENT RETURN ELECTRODE: Brand: VALLEYLAB

## (undated) DEVICE — STERILE POLYISOPRENE POWDER-FREE SURGICAL GLOVES: Brand: PROTEXIS

## (undated) DEVICE — 6619 2 PTNT ISO SYS INCISE AREA&LT;(&GT;&&LT;)&GT;P: Brand: STERI-DRAPE™ IOBAN™ 2

## (undated) DEVICE — SPONGE GZ W4XL4IN COT 12 PLY TYP VII WVN C FLD DSGN

## (undated) DEVICE — SYR 10ML CTRL LR LCK NSAF LF --

## (undated) DEVICE — GAUZE SPONGES,12 PLY: Brand: CURITY

## (undated) DEVICE — NEUROSTIMULATOR EXT SM LTWT SGL BTTN H2O RESIST WIRELESS

## (undated) DEVICE — Z DISCONTINUED NO SUB IDED JR KIT NEUROSTIMULATOR TORQ WRNCH BOOT ST

## (undated) DEVICE — ANTENNA PT PRGMR EXT DETACH RECHRG DBS LEGEND

## (undated) DEVICE — BSHR MAJOR BASIN PACK-LF: Brand: MEDLINE INDUSTRIES, INC.

## (undated) DEVICE — 3-0 COATED VICRYL PLUS UNDYED 1X27" SH --

## (undated) DEVICE — SUTURE COAT VCRL SZ 4-0 L18IN ABSRB UD L19MM PS-2 1/2 CIR J496G

## (undated) DEVICE — CABLE NEUROSTIMULATOR TST STIM INTERSTIM

## (undated) DEVICE — SUTURE VCRL + SZ 4-0 L27IN ABSRB UD PS-2 3/8 CIR REV CUT VCP426H

## (undated) DEVICE — KENDALL SCD EXPRESS SLEEVES, KNEE LENGTH, MEDIUM: Brand: KENDALL SCD

## (undated) DEVICE — DERMABOND SKIN ADH 0.7ML -- DERMABOND ADVANCED 12/BX

## (undated) DEVICE — SUTURE VCRL SZ 3-0 L27IN ABSRB UD L26MM SH 1/2 CIR J416H

## (undated) DEVICE — (D)PREP SKN CHLRAPRP APPL 26ML -- CONVERT TO ITEM 371833